# Patient Record
Sex: FEMALE | Race: WHITE | NOT HISPANIC OR LATINO | Employment: OTHER | URBAN - METROPOLITAN AREA
[De-identification: names, ages, dates, MRNs, and addresses within clinical notes are randomized per-mention and may not be internally consistent; named-entity substitution may affect disease eponyms.]

---

## 2017-01-06 ENCOUNTER — TELEPHONE (OUTPATIENT)
Dept: ELECTROPHYSIOLOGY | Facility: CLINIC | Age: 62
End: 2017-01-06

## 2017-01-06 NOTE — TELEPHONE ENCOUNTER
Called pt to schedule tilt test as referred by . Pt voiced she does was not aware of need for test. Pt reports she has felt better since stopping a particular medication started by her Endocrinologist. Pt verbalized she will speak with her daughter who is a nurse and call back with final decision. Spent time describing reason for, and what is done during test. Understanding verbalized.

## 2017-01-07 ENCOUNTER — PATIENT MESSAGE (OUTPATIENT)
Dept: OPHTHALMOLOGY | Facility: CLINIC | Age: 62
End: 2017-01-07

## 2017-01-07 ENCOUNTER — PATIENT MESSAGE (OUTPATIENT)
Dept: CARDIOLOGY | Facility: CLINIC | Age: 62
End: 2017-01-07

## 2017-01-09 NOTE — TELEPHONE ENCOUNTER
Pt notified. Pt verbalized understanding. Pt stated that she does not want to do tilt table test. Pt stated that she does not think that she would be able to handle doing the test because of the length of time it takes to complete the test and that she is also concerned that the test will be costly.

## 2017-01-10 ENCOUNTER — PATIENT MESSAGE (OUTPATIENT)
Dept: ENDOCRINOLOGY | Facility: CLINIC | Age: 62
End: 2017-01-10

## 2017-01-10 ENCOUNTER — OFFICE VISIT (OUTPATIENT)
Dept: ENDOCRINOLOGY | Facility: CLINIC | Age: 62
End: 2017-01-10
Payer: COMMERCIAL

## 2017-01-10 VITALS
SYSTOLIC BLOOD PRESSURE: 126 MMHG | HEART RATE: 79 BPM | RESPIRATION RATE: 16 BRPM | WEIGHT: 95.25 LBS | DIASTOLIC BLOOD PRESSURE: 86 MMHG | HEIGHT: 59 IN | BODY MASS INDEX: 19.2 KG/M2

## 2017-01-10 DIAGNOSIS — E55.9 VITAMIN D DEFICIENCY: ICD-10-CM

## 2017-01-10 DIAGNOSIS — E10.649 TYPE 1 DIABETES MELLITUS WITH HYPOGLYCEMIA AND WITHOUT COMA: ICD-10-CM

## 2017-01-10 DIAGNOSIS — Z13.29 SCREENING FOR THYROID DISORDER: ICD-10-CM

## 2017-01-10 PROCEDURE — 1159F MED LIST DOCD IN RCRD: CPT | Mod: S$GLB,,, | Performed by: INTERNAL MEDICINE

## 2017-01-10 PROCEDURE — 4010F ACE/ARB THERAPY RXD/TAKEN: CPT | Mod: S$GLB,,, | Performed by: INTERNAL MEDICINE

## 2017-01-10 PROCEDURE — 99999 PR PBB SHADOW E&M-EST. PATIENT-LVL III: CPT | Mod: PBBFAC,,, | Performed by: INTERNAL MEDICINE

## 2017-01-10 PROCEDURE — 99205 OFFICE O/P NEW HI 60 MIN: CPT | Mod: S$GLB,,, | Performed by: INTERNAL MEDICINE

## 2017-01-10 PROCEDURE — 3046F HEMOGLOBIN A1C LEVEL >9.0%: CPT | Mod: S$GLB,,, | Performed by: INTERNAL MEDICINE

## 2017-01-10 NOTE — PROGRESS NOTES
"Chief Complaint: No chief complaint on file.      HPI:  Annie Maher is 61 y.o. female with T1DM, initially diagnosed at age 20 , here today for evaluation and management   She is a prior patient of Dr. Granger and SAMMIE Reed, [facility] - last office visit in      Since that time, she has followed with an outside Endocrinologist and PCP ( [facility])     She presents to clinic today due to concerns of recent hypoglycemic episodes.  By her report, her diabetes has been "brittle" since the time of diagnosis. She is unaware of low sugars and is "terrified" of hypoglycemia.     She is very anxious, nervous and confused about her insulin regimen at this time     Current DM medications:   Lantus 16 units every AM   Novolog  2/2/2 + correction scale   7am -7pm : 1 units /12 grams   7pm -7am: 1 units / 20 grams     Uses vials or pens: pens   Type of meter: once touch verio     SMBG 4-6 times daily. Personal review of blood glucose log   Fastin, 133,   Lunch: 148, 213, 84  Dinner: 148, 161, 201  Bedtime: 108, 207, 202  1am readin  2am readin  4am readin, 146  6am readin    Tuesday Adrian. 10, 2017  12am = 80  4:50am  = 46, apple juice   8:30 am = 92  10am = 143  11.45am = 342    Has CGM device at home but patient denies use. She states that the device is uncomfortable for her     Hypoglycemic episodes mostly occur early morning after 12 midnight.   She reports giving additional insulin at bedtime per correction scale  if her blood glucose is over 150 mg/dL     Diabetes complications: hypoglycemia, neuropathy and retinopathy     Diabetic eye exam: 2016   Podiatry exam: "a few years ago"     Reports hospital admission related to diabetes , at initial diagnosis     Denies symptomatic CAD or CVD     24 hour dietary recall:   Breakfast: 2 slices of wheat toast with peanut butter and coffee with milk   Lunch: skipped lunch   Dinner: chicken salad with wheat wrap and " fruit, water   Snacks: handful of cashews     Diabetes education: Yes  Exercise: Elliptical _ 1-2  Times weekly     ADA STANDARDS OF CARE   ACE inhibitor or angiotension II receptor blocker: recently discontinued by Cardiology   Statin drug: denies   Low dose ASA: denies   Dental exam: up to date   Flu shot: per PCP   Pneumonia vaccine: denies     Wears medic alert tag: Yes    Has Glucagon ER kit: No      Review of Systems   Constitutional: Positive for appetite change. Negative for fatigue and unexpected weight change.   HENT: Negative for sore throat and trouble swallowing.    Eyes: Negative for visual disturbance.   Respiratory: Negative for cough and shortness of breath.    Cardiovascular: Negative for chest pain, palpitations and leg swelling.   Gastrointestinal: Negative for abdominal pain, constipation, diarrhea, nausea and vomiting.   Endocrine: Negative for cold intolerance, heat intolerance, polydipsia, polyphagia and polyuria.   Genitourinary: Negative for dysuria.   Musculoskeletal: Negative for back pain.   Skin: Negative for rash and wound.   Allergic/Immunologic: Negative for immunocompromised state.   Neurological: Negative for dizziness, numbness and headaches.   Hematological: Does not bruise/bleed easily.   Psychiatric/Behavioral: Positive for confusion. Negative for sleep disturbance. The patient is nervous/anxious.        Physical Exam   Constitutional: She is oriented to person, place, and time. No distress.   Small frame   HENT:   Right Ear: External ear normal.   Left Ear: External ear normal.   Nose: Nose normal.   Hearing normal  Dentition good    Neck: No tracheal deviation present. No thyromegaly present.   Cardiovascular: Normal rate and regular rhythm.    No murmur heard.  Pulses:       Dorsalis pedis pulses are 2+ on the right side, and 2+ on the left side.   Pulmonary/Chest: Effort normal and breath sounds normal.   Abdominal: Soft. There is no tenderness. No hernia.    Musculoskeletal: She exhibits no edema or tenderness.        Right foot: There is no deformity.        Left foot: There is no deformity.   Gait normal  No clubbing or cyanosis noted   Feet:   Right Foot:   Protective Sensation: 6 sites tested. 6 sites sensed.   Skin Integrity: Negative for ulcer, blister, skin breakdown, callus or dry skin.   Left Foot:   Protective Sensation: 6 sites tested. 6 sites sensed.   Skin Integrity: Negative for ulcer, blister, skin breakdown, callus or dry skin.   Neurological: She is alert and oriented to person, place, and time. No cranial nerve deficit.   Feet -sensation intact to vibration and monofilament     Skin: Skin is warm and dry. No rash noted.   No nodules  Injection sites are ok. No lipo hypertropthy or atrophy     Psychiatric: She has a normal mood and affect. Judgment normal.   Nursing note and vitals reviewed.      Labs:  Hemoglobin A1C   Date Value Ref Range Status   06/21/2010 7.9 (H) 4.0 - 6.2 % Final   07/10/2008 7.2 (H) 4.0 - 6.2 % Final   05/01/2008 7.5 (H) 4.0 - 6.2 % Final     No results found for: CHOL, HDL, LDLCALC, TRIG, CHOLHDL  Lab Results   Component Value Date     05/01/2008    K 3.6 05/01/2008     05/01/2008    CO2 29 05/01/2008     (H) 05/01/2008    BUN 13 05/01/2008    CREATININE 0.8 05/01/2008    CALCIUM 9.8 05/01/2008    PROT 7.3 05/01/2008    ALBUMIN 5.1 05/01/2008    BILITOT 0.3 05/01/2008    ALKPHOS 83 05/01/2008    AST 24 05/01/2008    ALT 15 05/01/2008         Assessment and Plan:  Type 1 diabetes mellitus with hypoglycemia and without coma  - pt is very confused about insulin timing despite having diabetes since the age of 20   - discussed possibility of insulin pump - pt declined   - recommend CGM for 72 hours   - for now, will start weight based dosing   - lantus 10 units every AM   - continue Novolog 2/2/2 with I:C = 1:20   - avoid additional Novolog at bedtime   - smbg 4-6 times daily   - send logs in 2 weeks for review  and insulin adjustments  - wear Medic alert tag at all times   - new Rx for Glucagon ER kit   -     GLUCOSE MONITORING CONTINUOUS MIN 72 HOURS; Future  -     glucagon (human recombinant) inj 1mg/mL kit; Inject 1 mL (1 mg total) into the muscle as needed.  Dispense: 1 kit; Refill: 6  -     Comprehensive metabolic panel; Future; Expected date: 1/10/17  -     Hemoglobin A1c; Future; Expected date: 1/10/17  -     Cortisol, 8AM; Future; Expected date: 1/10/17  -     TISSUE TRANSGLUTAMINASE, IGA; Future; Expected date: 1/10/17    Screening for thyroid disorder  -     TSH; Future; Expected date: 1/10/17  -     Thyroid peroxidase antibody; Future; Expected date: 1/10/17    Vitamin D deficiency  -     Vitamin D; Future; Expected date: 1/10/17      Case discussed in consultation with Dr. Granger   Recommendations were discussed with the patient in detail  The patient agrees with the treatment plan as outlined above    RTC in 6 weeks for follow up   The patient is instructed to notify the office with BG concerns or questions

## 2017-01-10 NOTE — MR AVS SNAPSHOT
[facility]  [address]  [place] [address]  Phone: [phone]  Fax: [phone]                  Annie Maher   1/10/2017 11:30 AM   Office Visit    Description:  Female : 1955   Provider:  Hailee Jimenez NP   Department:  [facility]           Diagnoses this Visit        Comments    Type 1 diabetes mellitus with hypoglycemia and without coma         Screening for thyroid disorder         Vitamin D deficiency                To Do List           Future Appointments        Provider Department Dept Phone    2/15/2017 8:00 AM LAB, [facility] [phone]    2017 10:30 AM [facility] [phone]      Goals (5 Years of Data)     None      Follow-Up and Disposition     Return in about 6 weeks (around 2017).       These Medications        Disp Refills Start End    glucagon (human recombinant) inj 1mg/mL kit 1 kit 6 1/10/2017 1/10/2018    Inject 1 mL (1 mg total) into the muscle as needed. - Intramuscular    Ph[facility]: [facility] - [place] [address] [address] Ph #: [phone]         [facility] [place] [facility]     [facility] -  Assistance  Registered nurses in the [facility] provide clinical advisement, health education, appointment booking, and other advisory services.  Call for this free service at [phone].             Medications           Message regarding Medications     Verify the changes and/or additions to your medication regime listed below are the same as discussed with your clinician today.  If any of these changes or additions are incorrect, please notify your healthcare provider.        START taking these NEW medications        Refills    glucagon (human recombinant) inj 1mg/mL kit 6    Sig: Inject 1 mL (1 mg total) into the muscle as needed.    Class: Normal    Route:  Intramuscular           Verify that the below list of medications is an accurate representation of the medications you are currently taking.  If none reported, the list may be blank. If incorrect, please contact your healthcare provider. Carry this list with you in case of emergency.           Current Medications     ACCU-CHEK COMPACT TEST Strp     alprazolam (XANAX) 1 MG tablet Take 1 tablet by mouth Use as needed.    aspirin 81 MG Chew Take 81 mg by mouth once daily.    calcium carbonate 220 mg capsule Take 220 mg by mouth once daily.     carisoprodol (SOMA) 350 MG tablet Take 1 tablet by mouth Daily.    fish oil-omega-3 fatty acids 300-1,000 mg capsule Take 2 g by mouth once daily.      FLUARIX QUAD 6293-0645, PF, 60 mcg (15 mcg x 4)/0.5 mL Syrg inject 0.5 milliliter intramuscularly    gabapentin (NEURONTIN) 300 MG capsule Take 1 tablet by mouth Twice daily.    glucosamine-chondroitin 500-400 mg tablet Take 1 tablet by mouth 3 (three) times daily. Pt taking once a day.    insulin glargine (LANTUS) 100 unit/mL injection Inject 7 Units into the skin 2 (two) times daily. 15 units once a day.    meloxicam (MOBIC) 15 MG tablet Take 1 tablet (15 mg total) by mouth once daily. Take a Prilosec 20 mg tablet (over the counter) once a day every day while taking Mobic    mirtazapine (REMERON) 15 MG tablet Take 15 mg by mouth every evening.     multivitamin capsule Take 1 capsule by mouth once daily.      NOVOLOG FLEXPEN 100 unit/mL InPn Sliding scale    omeprazole (PRILOSEC OTC) 20 MG tablet Take 20 mg by mouth once daily.    ONETOUCH VERIO Strp     ramipril (ALTACE) 5 MG capsule 5 mg.    sertraline (ZOLOFT) 100 MG tablet Take 1 tablet by mouth Twice daily.    tramadol (ULTRAM) 50 mg tablet Take 50 mg by mouth every 8 (eight) hours.    glucagon (human recombinant) inj 1mg/mL kit Inject 1 mL (1 mg total) into the muscle as needed.           Clinical Reference Information           Vital Signs - Last Recorded  Most recent  "update: 1/10/2017 11:59 AM by Della Hernandez MA    BP Pulse Resp Ht Wt BMI    126/86 (BP Location: Left arm, Patient Position: Sitting, BP Method: Manual) 79 16 4' 11" (1.499 m) 43.2 kg (95 lb 3.8 oz) 19.24 kg/m2      Blood Pressure          Most Recent Value    BP  126/86      Allergies as of 1/10/2017     Pcn [Penicillins]    Stelazine [Trifluoperazine]    Sulfa (Sulfonamide Antibiotics)      Immunizations Administered on Date of Encounter - 1/10/2017     None      Orders Placed During Today's Visit     Future Labs/Procedures Expected by Expires    Comprehensive metabolic panel  1/10/2017 (Approximate) 1/5/2018    Cortisol, 8AM  1/10/2017 3/11/2018    Hemoglobin A1c  1/10/2017 3/11/2018    Thyroid peroxidase antibody  1/10/2017 3/11/2018    TISSUE TRANSGLUTAMINASE, IGA  1/10/2017 3/11/2018    TSH  1/10/2017 (Approximate) 1/5/2018    Vitamin D  1/10/2017 3/11/2018    GLUCOSE MONITORING CONTINUOUS MIN 72 HOURS  As directed 1/10/2018      "

## 2017-01-17 ENCOUNTER — PATIENT MESSAGE (OUTPATIENT)
Dept: ENDOCRINOLOGY | Facility: CLINIC | Age: 62
End: 2017-01-17

## 2017-01-18 ENCOUNTER — CLINICAL SUPPORT (OUTPATIENT)
Dept: ENDOCRINOLOGY | Facility: CLINIC | Age: 62
End: 2017-01-18
Payer: COMMERCIAL

## 2017-01-18 NOTE — PROGRESS NOTES
"DIABETES EDUCATOR NOTE   PLACEMENT OF FREESTYLE DALY PRO SENSOR  CONTINOUS GLUCOSE MONITORING SYSTEM (CGMS)    Patient is here in clinic today for placement of continuous glucose monitoring sensor.      Each patient verified that they were here for CGMS procedure ordered by their provider and that they have a working glucose meter and supplies at home.   Patient will be provided with a Freestyle Daly Sensor and a copy of the Continuous Glucose Monitoring Patient Log to fill out during the study.   A detailed  explanation of Continuous Glucose Monitoring was  provided. Patient informed that this is a blind procedure and that they will not actually see the blood sugar tracing in real time.  Reviewed with patient the [employer] patient education handout called "Your Freestyle Daly Pro sensor: What you need to know" to review self-care during the study to avoid sensor loosening or removal ie...bathing, swimming, dressing, and exercising.   Instructed patient to check blood sugar using home glucometer and to record the following on provided patient log sheets:  blood sugar taken at home, meals and snacks, activity, and diabetes medications taken and dosage    Patient was brought to a private location.   Arm for insertion was selected and prepared and allowed to dry. Glucose sensor Serial Number [id number] was inserted to back of patients left upper arm.     The following forms were given and reviewed in detail with patient and all questions answered.   · Continuous Glucose Monitoring Patient Log #[id number]  · Freestyle [employer] Patient Handout "Your Freestyle Daly Pro Sensor: What you need to know"     Instructions held in a group: Time: 15 min   Insertion of sensor done individually in private:  Time: 5 minutes   "

## 2017-01-23 ENCOUNTER — CLINICAL SUPPORT (OUTPATIENT)
Dept: ENDOCRINOLOGY | Facility: CLINIC | Age: 62
End: 2017-01-23
Payer: COMMERCIAL

## 2017-01-23 DIAGNOSIS — E10.649 TYPE 1 DIABETES MELLITUS WITH HYPOGLYCEMIA AND WITHOUT COMA: ICD-10-CM

## 2017-01-23 PROCEDURE — 99999 PR PBB SHADOW E&M-EST. PATIENT-LVL I: CPT | Mod: PBBFAC,,,

## 2017-01-23 PROCEDURE — 95250 CONT GLUC MNTR PHYS/QHP EQP: CPT | Mod: S$GLB,,, | Performed by: DIETITIAN, REGISTERED

## 2017-01-24 ENCOUNTER — PATIENT MESSAGE (OUTPATIENT)
Dept: ENDOCRINOLOGY | Facility: CLINIC | Age: 62
End: 2017-01-24

## 2017-01-25 NOTE — PROGRESS NOTES
DIABETES EDUCATOR NOTE   Return of Freestyle Cammy Pro Sensor and Patient Log.    Patient returned to clinic today to return Glucose Sensor and signed patient log form used in [facility] procedure.    The CGMS Sensor will be scanned and downloaded. All reports will be imported into the patient's electronic medical record.    Endocrine Nurse Practitioner will complete data interpretation and make recommendations; will forward recommendations to the ordering provider for follow up with patient.

## 2017-01-27 ENCOUNTER — PATIENT MESSAGE (OUTPATIENT)
Dept: ENDOCRINOLOGY | Facility: CLINIC | Age: 62
End: 2017-01-27

## 2017-01-27 DIAGNOSIS — E10.649 TYPE 1 DIABETES MELLITUS WITH HYPOGLYCEMIA AND WITHOUT COMA: Primary | ICD-10-CM

## 2017-01-31 RX ORDER — MELOXICAM 15 MG/1
TABLET ORAL
Qty: 30 TABLET | Refills: 2 | Status: SHIPPED | OUTPATIENT
Start: 2017-01-31 | End: 2020-01-30

## 2017-02-01 ENCOUNTER — PATIENT MESSAGE (OUTPATIENT)
Dept: ENDOCRINOLOGY | Facility: CLINIC | Age: 62
End: 2017-02-01

## 2017-02-15 ENCOUNTER — LAB VISIT (OUTPATIENT)
Dept: LAB | Facility: HOSPITAL | Age: 62
End: 2017-02-15
Attending: INTERNAL MEDICINE
Payer: COMMERCIAL

## 2017-02-15 DIAGNOSIS — Z13.29 SCREENING FOR THYROID DISORDER: ICD-10-CM

## 2017-02-15 DIAGNOSIS — E55.9 VITAMIN D DEFICIENCY: ICD-10-CM

## 2017-02-15 DIAGNOSIS — E10.649 TYPE 1 DIABETES MELLITUS WITH HYPOGLYCEMIA AND WITHOUT COMA: ICD-10-CM

## 2017-02-15 LAB
25(OH)D3+25(OH)D2 SERPL-MCNC: 23 NG/ML
ALBUMIN SERPL BCP-MCNC: 4.1 G/DL
ALP SERPL-CCNC: 68 U/L
ALT SERPL W/O P-5'-P-CCNC: 18 U/L
ANION GAP SERPL CALC-SCNC: 9 MMOL/L
AST SERPL-CCNC: 31 U/L
BILIRUB SERPL-MCNC: 0.2 MG/DL
BUN SERPL-MCNC: 14 MG/DL
CALCIUM SERPL-MCNC: 10 MG/DL
CHLORIDE SERPL-SCNC: 99 MMOL/L
CO2 SERPL-SCNC: 30 MMOL/L
CORTIS SERPL-MCNC: 17.2 UG/DL
CREAT SERPL-MCNC: 0.9 MG/DL
EST. GFR  (AFRICAN AMERICAN): >60 ML/MIN/1.73 M^2
EST. GFR  (NON AFRICAN AMERICAN): >60 ML/MIN/1.73 M^2
ESTIMATED AVG GLUCOSE: 177 MG/DL
GLUCOSE SERPL-MCNC: 169 MG/DL
HBA1C MFR BLD HPLC: 7.8 %
POTASSIUM SERPL-SCNC: 4.3 MMOL/L
PROT SERPL-MCNC: 7.5 G/DL
SODIUM SERPL-SCNC: 138 MMOL/L
T4 FREE SERPL-MCNC: 0.91 NG/DL
THYROPEROXIDASE IGG SERPL-ACNC: <6 IU/ML
TSH SERPL DL<=0.005 MIU/L-ACNC: 4.95 UIU/ML

## 2017-02-15 PROCEDURE — 80053 COMPREHEN METABOLIC PANEL: CPT

## 2017-02-15 PROCEDURE — 84443 ASSAY THYROID STIM HORMONE: CPT

## 2017-02-15 PROCEDURE — 82306 VITAMIN D 25 HYDROXY: CPT

## 2017-02-15 PROCEDURE — 36415 COLL VENOUS BLD VENIPUNCTURE: CPT | Mod: PO

## 2017-02-15 PROCEDURE — 86376 MICROSOMAL ANTIBODY EACH: CPT

## 2017-02-15 PROCEDURE — 83516 IMMUNOASSAY NONANTIBODY: CPT

## 2017-02-15 PROCEDURE — 82533 TOTAL CORTISOL: CPT

## 2017-02-15 PROCEDURE — 83036 HEMOGLOBIN GLYCOSYLATED A1C: CPT

## 2017-02-15 PROCEDURE — 84439 ASSAY OF FREE THYROXINE: CPT

## 2017-02-17 LAB — TTG IGA SER IA-ACNC: 4 UNITS

## 2017-02-21 ENCOUNTER — OFFICE VISIT (OUTPATIENT)
Dept: ENDOCRINOLOGY | Facility: CLINIC | Age: 62
End: 2017-02-21
Payer: COMMERCIAL

## 2017-02-21 VITALS
SYSTOLIC BLOOD PRESSURE: 122 MMHG | HEART RATE: 80 BPM | WEIGHT: 96.31 LBS | RESPIRATION RATE: 16 BRPM | HEIGHT: 59 IN | DIASTOLIC BLOOD PRESSURE: 74 MMHG | BODY MASS INDEX: 19.42 KG/M2

## 2017-02-21 DIAGNOSIS — E10.649 TYPE 1 DIABETES MELLITUS WITH HYPOGLYCEMIA AND WITHOUT COMA: Primary | ICD-10-CM

## 2017-02-21 DIAGNOSIS — E55.9 VITAMIN D DEFICIENCY: ICD-10-CM

## 2017-02-21 DIAGNOSIS — R79.89 ABNORMAL THYROID STIMULATING HORMONE (TSH) LEVEL: ICD-10-CM

## 2017-02-21 PROCEDURE — 99214 OFFICE O/P EST MOD 30 MIN: CPT | Mod: S$GLB,,, | Performed by: INTERNAL MEDICINE

## 2017-02-21 PROCEDURE — 99999 PR PBB SHADOW E&M-EST. PATIENT-LVL III: CPT | Mod: PBBFAC,,, | Performed by: INTERNAL MEDICINE

## 2017-02-21 PROCEDURE — 3045F PR MOST RECENT HEMOGLOBIN A1C LEVEL 7.0-9.0%: CPT | Mod: S$GLB,,, | Performed by: INTERNAL MEDICINE

## 2017-02-21 PROCEDURE — 1160F RVW MEDS BY RX/DR IN RCRD: CPT | Mod: S$GLB,,, | Performed by: INTERNAL MEDICINE

## 2017-02-21 PROCEDURE — 3060F POS MICROALBUMINURIA REV: CPT | Mod: S$GLB,,, | Performed by: INTERNAL MEDICINE

## 2017-02-21 NOTE — MR AVS SNAPSHOT
[facility]  [address]  [facility] [address]  Phone: [phone]  Fax: [phone]                  Annie Maher   2017 10:30 AM   Office Visit    Description:  Female : 1955   Provider:  Hailee Jimenez NP   Department:  [facility]           Reason for Visit     Diabetes Mellitus           Diagnoses this Visit        Comments    Type 1 diabetes mellitus with hypoglycemia and without coma    -  Primary     Vitamin D deficiency         Abnormal thyroid stimulating hormone (TSH) level                To Do List           Future Appointments        Provider Department Dept Phone    2017 8:30 AM [facility] [phone]    2017 3:30 PM [facility] [phone]      Goals (5 Years of Data)     None      Follow-Up and Disposition     Return in about 3 months (around 2017).      [facility]     [facility] - 24/7 Assistance  Registered nurses in the [facility] provide clinical advisement, health education, appointment booking, and other advisory services.  Call for this free service at [phone].             Medications           Message regarding Medications     Verify the changes and/or additions to your medication regime listed below are the same as discussed with your clinician today.  If any of these changes or additions are incorrect, please notify your healthcare provider.             Verify that the below list of medications is an accurate representation of the medications you are currently taking.  If none reported, the list may be blank. If incorrect, please contact your healthcare provider. Carry this list with you in case of emergency.           Current Medications     ACCU-CHEK COMPACT TEST Strp     alprazolam (XANAX) 1 MG tablet Take 1 tablet by mouth Use as needed.    aspirin 81 MG Chew Take  "81 mg by mouth once daily.    calcium carbonate 220 mg capsule Take 220 mg by mouth once daily.     carisoprodol (SOMA) 350 MG tablet Take 1 tablet by mouth Daily.    fish oil-omega-3 fatty acids 300-1,000 mg capsule Take 2 g by mouth once daily.      FLUARIX QUAD 0287-0049, PF, 60 mcg (15 mcg x 4)/0.5 mL Syrg inject 0.5 milliliter intramuscularly    gabapentin (NEURONTIN) 300 MG capsule Take 1 tablet by mouth Twice daily.    glucagon (human recombinant) inj 1mg/mL kit Inject 1 mL (1 mg total) into the muscle as needed.    glucosamine-chondroitin 500-400 mg tablet Take 1 tablet by mouth 3 (three) times daily. Pt taking once a day.    insulin glargine (LANTUS) 100 unit/mL injection Inject 7 Units into the skin 2 (two) times daily. 15 units once a day.    meloxicam (MOBIC) 15 MG tablet TAKE 1 TABLET BY MOUTH ONCE DAILY. TAKE A PRILOSEC (OVER THE COUNTER) ONCE A DAY WHILE TAKING MELOXICAM    mirtazapine (REMERON) 15 MG tablet Take 15 mg by mouth every evening.     multivitamin capsule Take 1 capsule by mouth once daily.      NOVOLOG FLEXPEN 100 unit/mL InPn Sliding scale    omeprazole (PRILOSEC OTC) 20 MG tablet Take 20 mg by mouth once daily.    ONETOUCH VERIO Strp     ramipril (ALTACE) 5 MG capsule 5 mg.    sertraline (ZOLOFT) 100 MG tablet Take 1 tablet by mouth Twice daily.    tramadol (ULTRAM) 50 mg tablet Take 50 mg by mouth every 8 (eight) hours.           Clinical Reference Information           Your Vitals Were     BP Pulse Resp Height Weight BMI    122/74 (BP Location: Right arm, Patient Position: Sitting, BP Method: Manual) 80 16 4' 11" (1.499 m) 43.7 kg (96 lb 5.5 oz) 19.46 kg/m2      Blood Pressure          Most Recent Value    BP  122/74      Allergies as of 2/21/2017     Pcn [Penicillins]    Stelazine [Trifluoperazine]    Sulfa (Sulfonamide Antibiotics)      Immunizations Administered on Date of Encounter - 2/21/2017     None      Orders Placed During Today's Visit      Normal Orders This Visit    " Ambulatory Referral to Diabetes Education     Future Labs/Procedures Expected by Expires    Comprehensive metabolic panel  2/21/2017 (Approximate) 2/16/2018    Hemoglobin A1c  2/21/2017 4/22/2018    Lipid panel  2/21/2017 3/28/2018    TSH  2/21/2017 (Approximate) 2/16/2018      Language Assistance Services     ATTENTION: Language assistance services are available, free of charge. Please call [phone].      ATENCIÓN: Si habla español, tiene a denise disposición servicios gratuitos de asistencia lingüística. Llame al [phone].     CHÚ Ý: N?u b?n nói Ti?ng Vi?t, có các d?ch v? h? tr? ngôn ng? mi?n phí dành cho b?n. G?i s? [phone].         [facility] complies with applicable Federal civil rights laws and does not discriminate on the basis of race, color, national origin, age, disability, or sex.

## 2017-02-21 NOTE — PROGRESS NOTES
"Chief Complaint: Diabetes Mellitus      HPI:  Annie Maher is 61 y.o. female with T1DM, initially diagnosed at age 20 , here today for follow up visit   The patient is known to me with last documented office visit in 01/2017   Pt reports better blood glucose control since last visit     Current DM medications:   Lantus 12 units every AM   Novolog with I:C = 1:20     Uses vials or pens: pens   Type of meter: once touch verio     SMBG 4-6 times daily. Personal review of blood glucose log from 02/14/2017-02/20/2017:  5am: 222, 75, 99, 99, 198  AC breakfast: 218, 156, 187, 74, 164, 266, 129, 213  1pm: 286, 178, 221, 144, 232, 200, 202  6pm: 187, 110, 135, 290, 205, 204, 139  8pm: 56, 405,   10pm: 150, 144, 190, 288, 432(no insulin with dinner), 170    Has CGM device at home but patient denies use. She states that the device is uncomfortable for her     Hypoglycemia: 2 episodes of hypoglycemia since last visit   Treated appropriately by taking 6 ounces of juice and complex carb     Diabetes complications: hypoglycemia, neuropathy and retinopathy     Diabetic eye exam: 12/22/2016   Podiatry exam: "a few years ago"     Reports hospital admission related to diabetes , at initial diagnosis     Denies symptomatic CAD or CVD     Diabetes education: Yes  Exercise: Elliptical _ 1-2  Times weekly     ADA STANDARDS OF CARE   ACE inhibitor or angiotension II receptor blocker: not taking, discontinued per Cardiology   Statin drug: discontinued by another provider   Low dose ASA: denies   Dental exam: up to date   Flu shot: per PCP   Pneumonia vaccine: denies     Wears medic alert tag: yes     Has Glucagon ER kit: yes       Review of Systems   Constitutional: Positive for appetite change. Negative for fatigue and unexpected weight change.   HENT: Negative for sore throat and trouble swallowing.    Eyes: Negative for visual disturbance.   Respiratory: Negative for cough and shortness of breath.    Cardiovascular: Negative for chest " pain, palpitations and leg swelling.   Gastrointestinal: Negative for abdominal pain, constipation, diarrhea, nausea and vomiting.   Endocrine: Negative for cold intolerance, heat intolerance, polydipsia, polyphagia and polyuria.   Genitourinary: Negative for dysuria.   Musculoskeletal: Negative for back pain.   Skin: Negative for rash and wound.   Allergic/Immunologic: Negative for immunocompromised state.   Neurological: Negative for dizziness, numbness and headaches.   Hematological: Does not bruise/bleed easily.   Psychiatric/Behavioral: Positive for confusion. Negative for sleep disturbance. The patient is nervous/anxious.        Physical Exam   Constitutional: She is oriented to person, place, and time. No distress.   Small frame   HENT:   Right Ear: External ear normal.   Left Ear: External ear normal.   Nose: Nose normal.   Hearing normal  Dentition good    Neck: No tracheal deviation present. No thyromegaly present.   Cardiovascular: Normal rate and regular rhythm.    No murmur heard.  Pulses:       Dorsalis pedis pulses are 2+ on the right side, and 2+ on the left side.   Pulmonary/Chest: Effort normal and breath sounds normal.   Abdominal: Soft. There is no tenderness. No hernia.   Musculoskeletal: She exhibits no edema or tenderness.        Right foot: There is no deformity.        Left foot: There is no deformity.   Gait normal  No clubbing or cyanosis noted   Feet:   Right Foot:   Protective Sensation: 6 sites tested. 6 sites sensed.   Skin Integrity: Negative for ulcer, blister, skin breakdown, callus or dry skin.   Left Foot:   Protective Sensation: 6 sites tested. 6 sites sensed.   Skin Integrity: Negative for ulcer, blister, skin breakdown, callus or dry skin.   Neurological: She is alert and oriented to person, place, and time. No cranial nerve deficit.   Feet -sensation intact to vibration and monofilament     Skin: Skin is warm and dry. No rash noted.   No nodules  Injection sites are ok. No  lipo hypertropthy or atrophy     Psychiatric: She has a normal mood and affect. Judgment normal.   Nursing note and vitals reviewed.      Labs:  Hemoglobin A1C   Date Value Ref Range Status   02/15/2017 7.8 (H) 4.5 - 6.2 % Final     Comment:     According to ADA guidelines, hemoglobin A1C <7.0% represents  optimal control in non-pregnant diabetic patients.  Different  metrics may apply to specific populations.   Standards of Medical Care in Diabetes - 2016.  For the purpose of screening for the presence of diabetes:  <5.7%     Consistent with the absence of diabetes  5.7-6.4%  Consistent with increasing risk for diabetes   (prediabetes)  >or=6.5%  Consistent with diabetes  Currently no consensus exists for use of hemoglobin A1C  for diagnosis of diabetes for children.     06/21/2010 7.9 (H) 4.0 - 6.2 % Final   07/10/2008 7.2 (H) 4.0 - 6.2 % Final   05/01/2008 7.5 (H) 4.0 - 6.2 % Final     No results found for: CHOL, HDL, LDLCALC, TRIG, CHOLHDL  Lab Results   Component Value Date     02/15/2017    K 4.3 02/15/2017    CL 99 02/15/2017    CO2 30 (H) 02/15/2017     (H) 02/15/2017    BUN 14 02/15/2017    CREATININE 0.9 02/15/2017    CALCIUM 10.0 02/15/2017    PROT 7.5 02/15/2017    ALBUMIN 4.1 02/15/2017    BILITOT 0.2 02/15/2017    ALKPHOS 68 02/15/2017    AST 31 02/15/2017    ALT 18 02/15/2017    ANIONGAP 9 02/15/2017    ESTGFRAFRICA >60.0 02/15/2017    EGFRNONAA >60.0 02/15/2017         Assessment and Plan:  1. Type 1 diabetes mellitus with hypoglycemia and without coma  - better control at this time per personal review of BG log   - continue Lantus 12 units every AM   - continue Novolog per correction scale with I:C = 1:20   - avoid additional Novolog at bedtime   - continue to smbg 4-6 times daily   - send logs in 2 weeks for review and insulin adjustments  - wear Medic alert tag at all times   - has glucagon ER kit at home   - for diabetic neuropathy: may add Alpha Lipoic acid to treatment plan   -  refer to Diabetes educator on the [facility] to discuss resuming CGM device         RTC in 3 months for follow up   The patient is instructed to notify the office with BG concerns or questions

## 2017-03-02 ENCOUNTER — TELEPHONE (OUTPATIENT)
Dept: DIABETES | Facility: CLINIC | Age: 62
End: 2017-03-02

## 2017-03-02 NOTE — TELEPHONE ENCOUNTER
Spoke with pt to schedule Diabetes education.  Pt needs to rely on her daughter to bring her.  They already have plans to have education done at [facility].  Pt declined education at this location.

## 2017-03-21 ENCOUNTER — TELEPHONE (OUTPATIENT)
Dept: CARDIOLOGY | Facility: CLINIC | Age: 62
End: 2017-03-21

## 2017-03-21 DIAGNOSIS — E11.43 AUTONOMIC DYSFUNCTION WITH TYPE 2 DIABETES MELLITUS: Primary | ICD-10-CM

## 2017-05-23 ENCOUNTER — PATIENT MESSAGE (OUTPATIENT)
Dept: ENDOCRINOLOGY | Facility: CLINIC | Age: 62
End: 2017-05-23

## 2017-05-23 ENCOUNTER — LAB VISIT (OUTPATIENT)
Dept: LAB | Facility: HOSPITAL | Age: 62
End: 2017-05-23
Attending: INTERNAL MEDICINE
Payer: COMMERCIAL

## 2017-05-23 DIAGNOSIS — E11.43 AUTONOMIC DYSFUNCTION WITH TYPE 2 DIABETES MELLITUS: ICD-10-CM

## 2017-05-23 DIAGNOSIS — E10.649 TYPE 1 DIABETES MELLITUS WITH HYPOGLYCEMIA AND WITHOUT COMA: ICD-10-CM

## 2017-05-23 DIAGNOSIS — R79.89 ABNORMAL THYROID STIMULATING HORMONE (TSH) LEVEL: ICD-10-CM

## 2017-05-23 LAB
ALBUMIN SERPL BCP-MCNC: 3.7 G/DL
ALBUMIN SERPL BCP-MCNC: 3.7 G/DL
ALP SERPL-CCNC: 49 U/L
ALP SERPL-CCNC: 49 U/L
ALT SERPL W/O P-5'-P-CCNC: 16 U/L
ALT SERPL W/O P-5'-P-CCNC: 16 U/L
ANION GAP SERPL CALC-SCNC: 13 MMOL/L
AST SERPL-CCNC: 30 U/L
AST SERPL-CCNC: 30 U/L
BILIRUB DIRECT SERPL-MCNC: 0.2 MG/DL
BILIRUB SERPL-MCNC: 0.5 MG/DL
BILIRUB SERPL-MCNC: 0.5 MG/DL
BUN SERPL-MCNC: 15 MG/DL
CALCIUM SERPL-MCNC: 9.7 MG/DL
CHLORIDE SERPL-SCNC: 102 MMOL/L
CHOLEST/HDLC SERPL: 3.3 {RATIO}
CO2 SERPL-SCNC: 27 MMOL/L
CREAT SERPL-MCNC: 0.8 MG/DL
EST. GFR  (AFRICAN AMERICAN): >60 ML/MIN/1.73 M^2
EST. GFR  (NON AFRICAN AMERICAN): >60 ML/MIN/1.73 M^2
GLUCOSE SERPL-MCNC: 142 MG/DL
HDL/CHOLESTEROL RATIO: 30 %
HDLC SERPL-MCNC: 270 MG/DL
HDLC SERPL-MCNC: 81 MG/DL
LDLC SERPL CALC-MCNC: 169.8 MG/DL
NONHDLC SERPL-MCNC: 189 MG/DL
POTASSIUM SERPL-SCNC: 4.4 MMOL/L
PROT SERPL-MCNC: 6.7 G/DL
PROT SERPL-MCNC: 6.7 G/DL
SODIUM SERPL-SCNC: 142 MMOL/L
T4 FREE SERPL-MCNC: 0.92 NG/DL
TRIGL SERPL-MCNC: 96 MG/DL
TSH SERPL DL<=0.005 MIU/L-ACNC: 4.73 UIU/ML

## 2017-05-23 PROCEDURE — 83036 HEMOGLOBIN GLYCOSYLATED A1C: CPT

## 2017-05-23 PROCEDURE — 80076 HEPATIC FUNCTION PANEL: CPT

## 2017-05-23 PROCEDURE — 80053 COMPREHEN METABOLIC PANEL: CPT

## 2017-05-23 PROCEDURE — 80061 LIPID PANEL: CPT

## 2017-05-23 PROCEDURE — 84443 ASSAY THYROID STIM HORMONE: CPT

## 2017-05-23 PROCEDURE — 84439 ASSAY OF FREE THYROXINE: CPT

## 2017-05-23 PROCEDURE — 36415 COLL VENOUS BLD VENIPUNCTURE: CPT | Mod: PO

## 2017-05-24 ENCOUNTER — TELEPHONE (OUTPATIENT)
Dept: CARDIOLOGY | Facility: CLINIC | Age: 62
End: 2017-05-24

## 2017-05-24 DIAGNOSIS — E78.5 DYSLIPIDEMIA: Primary | ICD-10-CM

## 2017-05-24 LAB
ESTIMATED AVG GLUCOSE: 171 MG/DL
HBA1C MFR BLD HPLC: 7.6 %

## 2017-05-24 RX ORDER — ROSUVASTATIN CALCIUM 40 MG/1
40 TABLET, COATED ORAL NIGHTLY
COMMUNITY

## 2017-05-24 NOTE — TELEPHONE ENCOUNTER
----- Message from Ciera Silva MD sent at 5/24/2017 12:43 AM CDT -----  Based on patient new lipid profile results and CV risk patient should be on statin.  She can either restart Crestor ( 20 mg daily, which is half of her original dose) or Atorvastatin 20 mg daily and repeat blood work in 2 months

## 2017-06-01 ENCOUNTER — OFFICE VISIT (OUTPATIENT)
Dept: ENDOCRINOLOGY | Facility: CLINIC | Age: 62
End: 2017-06-01
Payer: COMMERCIAL

## 2017-06-01 VITALS
RESPIRATION RATE: 16 BRPM | WEIGHT: 104.5 LBS | SYSTOLIC BLOOD PRESSURE: 117 MMHG | DIASTOLIC BLOOD PRESSURE: 64 MMHG | BODY MASS INDEX: 21.07 KG/M2 | HEART RATE: 80 BPM | HEIGHT: 59 IN

## 2017-06-01 DIAGNOSIS — E10.649 TYPE 1 DIABETES MELLITUS WITH HYPOGLYCEMIA AND WITHOUT COMA: Primary | ICD-10-CM

## 2017-06-01 DIAGNOSIS — R79.89 ABNORMAL THYROID STIMULATING HORMONE (TSH) LEVEL: ICD-10-CM

## 2017-06-01 DIAGNOSIS — E55.9 VITAMIN D DEFICIENCY: ICD-10-CM

## 2017-06-01 DIAGNOSIS — E78.5 HYPERLIPIDEMIA, UNSPECIFIED HYPERLIPIDEMIA TYPE: ICD-10-CM

## 2017-06-01 PROCEDURE — 3045F PR MOST RECENT HEMOGLOBIN A1C LEVEL 7.0-9.0%: CPT | Mod: S$GLB,,, | Performed by: INTERNAL MEDICINE

## 2017-06-01 PROCEDURE — 99999 PR PBB SHADOW E&M-EST. PATIENT-LVL III: CPT | Mod: PBBFAC,,, | Performed by: INTERNAL MEDICINE

## 2017-06-01 PROCEDURE — 99214 OFFICE O/P EST MOD 30 MIN: CPT | Mod: S$GLB,,, | Performed by: INTERNAL MEDICINE

## 2017-06-01 NOTE — PROGRESS NOTES
Chief Complaint: Diabetes Mellitus      HPI:  Annie Maher is 62 y.o. female with T1DM, initially diagnosed at age 20 , here today for follow up visit     Current DM medications:   Lantus 12 units every AM   Novolog with I:C = 1:20, usually aroung 3-5 units with each meal     Uses vials or pens: pens   Type of meter: once touch verio     SMBG 4-8 times daily. Very difficult to interpret BG log due to patient handwriting   She is inconsistent with writing down the time of BG checks     She states on average BG levels have been between 140's - 220's     Hypoglycemia: reports 2 blood sugars of 61 mg/dL within the past 7 weeks   She treats appropriately by taking 6 ounces of juice and complex carb     Not consistent with taking prandial injections with meals   Sometimes she takes Novolog up to 2 hours after eating     She is also administering Novolog at bedtime if BG is high and then she reports Hypoglcyemia around 2am      Has CGM device at home but patient refuses to use. She states that the device is uncomfortable for her     Known complications: hypoglycemia, neuropathy and retinopathy     Diabetic eye exam: 12/22/2016     Does not follow with podiatry     Reports hospital admission related to diabetes at initial diagnosis     Denies symptomatic CAD or CVD     Diabetes education: Yes, daughter reports on several occassions   Exercise: Elliptical _ 1-2  Times weekly     ADA STANDARDS OF CARE   ACE inhibitor or angiotension II receptor blocker: Altace discontinued per Cardiology due to hypotension   Statin drug: Crestor   Low dose ASA: denies   Dental exam: up to date     Review of Systems   Constitutional: Positive for appetite change. Negative for fatigue and unexpected weight change.   HENT: Negative for sore throat and trouble swallowing.    Eyes: Negative for visual disturbance.   Respiratory: Negative for cough and shortness of breath.    Cardiovascular: Negative for chest pain, palpitations and leg  swelling.   Gastrointestinal: Negative for abdominal pain, constipation, diarrhea, nausea and vomiting.   Endocrine: Negative for cold intolerance, heat intolerance, polydipsia, polyphagia and polyuria.   Genitourinary: Negative for dysuria.   Musculoskeletal: Negative for back pain.   Skin: Negative for rash and wound.   Allergic/Immunologic: Negative for immunocompromised state.   Neurological: Negative for dizziness, numbness and headaches.   Hematological: Does not bruise/bleed easily.   Psychiatric/Behavioral: Positive for confusion. Negative for sleep disturbance. The patient is nervous/anxious.        Physical Exam   Constitutional: No distress.   Small frame   Neck: No tracheal deviation present. No thyromegaly present.   Cardiovascular: Normal rate and regular rhythm.    No murmur heard.  Pulses:       Dorsalis pedis pulses are 2+ on the right side, and 2+ on the left side.   Pulmonary/Chest: Effort normal.   Abdominal: Soft. Bowel sounds are normal. There is no tenderness. No hernia.   Some abdominal bruising noted    Musculoskeletal: She exhibits no edema.        Right foot: There is no deformity.        Left foot: There is no deformity.   Gait normal  No clubbing or cyanosis noted   Feet:   Right Foot:   Protective Sensation: 6 sites tested. 6 sites sensed.   Skin Integrity: Negative for ulcer, blister, skin breakdown, callus or dry skin.   Left Foot:   Protective Sensation: 6 sites tested. 6 sites sensed.   Skin Integrity: Negative for ulcer, blister, skin breakdown, callus or dry skin.   Neurological: She is alert.   Mildly decreased vibratory sensation noted bilaterally      Skin: Skin is warm. No rash noted.        Psychiatric: Her speech is normal. Thought content normal. Her mood appears anxious. She is hyperactive. Cognition and memory are impaired.   Nursing note and vitals reviewed.  Feet: shoes inappropriate     Labs:  Hemoglobin A1C   Date Value Ref Range Status   05/23/2017 7.6 (H) 4.5 - 6.2  % Final     Comment:     According to ADA guidelines, hemoglobin A1C <7.0% represents  optimal control in non-pregnant diabetic patients.  Different  metrics may apply to specific populations.   Standards of Medical Care in Diabetes - 2016.  For the purpose of screening for the presence of diabetes:  <5.7%     Consistent with the absence of diabetes  5.7-6.4%  Consistent with increasing risk for diabetes   (prediabetes)  >or=6.5%  Consistent with diabetes  Currently no consensus exists for use of hemoglobin A1C  for diagnosis of diabetes for children.     02/15/2017 7.8 (H) 4.5 - 6.2 % Final     Comment:     According to ADA guidelines, hemoglobin A1C <7.0% represents  optimal control in non-pregnant diabetic patients.  Different  metrics may apply to specific populations.   Standards of Medical Care in Diabetes - 2016.  For the purpose of screening for the presence of diabetes:  <5.7%     Consistent with the absence of diabetes  5.7-6.4%  Consistent with increasing risk for diabetes   (prediabetes)  >or=6.5%  Consistent with diabetes  Currently no consensus exists for use of hemoglobin A1C  for diagnosis of diabetes for children.     06/21/2010 7.9 (H) 4.0 - 6.2 % Final   07/10/2008 7.2 (H) 4.0 - 6.2 % Final   05/01/2008 7.5 (H) 4.0 - 6.2 % Final     Lab Results   Component Value Date    CHOL 270 (H) 05/23/2017    HDL 81 (H) 05/23/2017    LDLCALC 169.8 (H) 05/23/2017    TRIG 96 05/23/2017    CHOLHDL 30.0 05/23/2017     Lab Results   Component Value Date     05/23/2017    K 4.4 05/23/2017     05/23/2017    CO2 27 05/23/2017     (H) 05/23/2017    BUN 15 05/23/2017    CREATININE 0.8 05/23/2017    CALCIUM 9.7 05/23/2017    PROT 6.7 05/23/2017    PROT 6.7 05/23/2017    ALBUMIN 3.7 05/23/2017    ALBUMIN 3.7 05/23/2017    BILITOT 0.5 05/23/2017    BILITOT 0.5 05/23/2017    ALKPHOS 49 (L) 05/23/2017    ALKPHOS 49 (L) 05/23/2017    AST 30 05/23/2017    AST 30 05/23/2017    ALT 16 05/23/2017    ALT 16  05/23/2017    ANIONGAP 13 05/23/2017    ESTGFRAFRICA >60.0 05/23/2017    EGFRNONAA >60.0 05/23/2017         Assessment and Plan:  1. Type 1 diabetes mellitus with hypoglycemia and without coma  Ambulatory Referral to Diabetes Education    Comprehensive metabolic panel    Hemoglobin A1c    Microalbumin/creatinine urine ratio   2. Hyperlipidemia, unspecified hyperlipidemia type     3. Vitamin D deficiency     4. Abnormal thyroid stimulating hormone (TSH) level  TSH       1. Type 1 diabetes mellitus with hypoglycemia and without coma  - better control at this time per personal review of BG log   - no changes to MDI   - avoid additional Novolog at bedtime due to hypoglycemia at 2am   - send logs in 2 weeks for review   - wear Medic alert tag at all times   - has glucagon ER kit at home   - for diabetic neuropathy: continue Alpha Lipoic Acid, pt reports improvement since starting therapy  - refer to diabetes education at main campus ,pt needs review of self management skills and also to possibly reintroduce CGM device   - pt will require close follow up due to declining mental status     2. Hyperlipidemia   - on Crestor 20 mg daily   - encouraged low fat, low chol diet   - followed by Cardiology     3. Vitamin D def   - start over the counter vitamin D3 at 1,000 IU's daily     4. Abnormal TFTs  - recommend periodic monitoring of thyroid function tests   - reviewed indications for treatment   - reviewed signs and symptoms of hypothyroidism         The patient is instructed to notify the office with BG concerns or questions

## 2017-07-18 ENCOUNTER — CLINICAL SUPPORT (OUTPATIENT)
Dept: DIABETES | Facility: CLINIC | Age: 62
End: 2017-07-18
Payer: COMMERCIAL

## 2017-07-18 DIAGNOSIS — E10.649 TYPE 1 DIABETES MELLITUS WITH HYPOGLYCEMIA AND WITHOUT COMA: ICD-10-CM

## 2017-07-18 PROCEDURE — G0108 DIAB MANAGE TRN  PER INDIV: HCPCS | Mod: S$GLB,,, | Performed by: DIETITIAN, REGISTERED

## 2017-07-19 NOTE — PROGRESS NOTES
Diabetes Education  Author: Alley Foy RD  Date: 7/19/2017    Diabetes Education Visit  Diabetes Education Record Assessment/Progress: Initial    Diabetes Type  Diabetes Type : Type I    Diabetes History  Diabetes Diagnosis: >10 years      Monitoring   Self Monitoring :  (SMBG 4-8 times daily)  Blood Glucose Logs: Yes (gobal ranges:  mg/dL)      Current Diabetes Treatment   Current Treatment: Insulin (Lantus (rx 15 units daily) Pt taking 12 units in the morning; Novolog 3-5 units with meals (depending on IC ratio of 1:20) + correction scale 180-230 +1)    Social History  Preferred Learning Method: Face to Face  Primary Support: Self, Daughter (daughter present at visit)    Barriers to Change  Barriers to Change: None (daughter reports beginning to have some issues with memory)  Learning Challenges : None    Readiness to Learn   Readiness to Learn : Acceptance    Cultural Influences  Cultural Influences: No      Diabetes Education Assessment/Progress  Acute Complications (preventing, detecting, and treating acute complications): Discussion, Individual Session, Written Materials Provided, Instructed (Reviewed hypo symptoms and how to treat. Pt with extreme hypo unawareness. Discussed resuming with Dexcom CGM to prevent hypos.)    Chronic Complications (preventing, detecting, and treating chronic complications): Discussion    Diabetes Disease Process (diabetes disease process and treatment options): Discussion    Nutrition (Incorporating nutritional management into one's lifestyle): Discussion, Individual Session, Written Materials Provided, Instructed (Reviewed IC ratio of 1:20. Reviewed ADA diet and recommendations. Discussed options for when pt is out to eat. Discussed ways to look up carb amounts online/apps. )    Medications (states correct name, dose, onset, peak, duration, side effects & timing of meds): Discussion, Individual Session, Written Materials Provided, Instructed (Discussed insulin pump and  basic features/advantages. Reviewed correction scale; reviewed timing and MOA of both insulins.)    Monitoring (monitoring blood glucose/other parameters & using results): Discussion, Individual Session, Written Materials Provided, Instructed, Demonstration, Return Demonstration (Pt and daughter both wish to resume Dexcom CGM. Pt brought all supplies with her today. Dexcom sensor placed and session started. Discussed alert settings; Increased low alert to 100 mg/dL so pt is aware when her BG is beginning to drop. Instructed pt that she does not need to do finger sticks so often with dexcom; she can base correction scale off of dexcom reading. )      Goals  Monitoring: Set (Will use dexcom as instructed)         Diabetes Care Plan/Intervention  Education Plan/Intervention: Individual Follow-Up DSMT, Endocrine Provider Visit Set Up    Diabetes Meal Plan  Restrictions: Restricted Carbohydrate    Education Units of Time   Time Spent: 90 min      Health Maintenance Topics with due status: Not Due       Topic Last Completion Date    Eye Exam 12/22/2016    Lipid Panel 05/23/2017    Hemoglobin A1c 05/23/2017    Foot Exam 06/01/2017    Influenza Vaccine Not Due     Health Maintenance Due   Topic Date Due    Hepatitis C Screening  1955    Urine Microalbumin  03/05/1963    TETANUS VACCINE  03/05/1971    Pneumococcal PPSV23 (Medium Risk) (1) 03/05/1971    Pap Smear with HPV Cotest  03/05/1974    Mammogram  03/05/1993    Colonoscopy  03/05/2003    Zoster Vaccine  03/05/2013

## 2017-07-25 ENCOUNTER — LAB VISIT (OUTPATIENT)
Dept: LAB | Facility: HOSPITAL | Age: 62
End: 2017-07-25
Attending: INTERNAL MEDICINE
Payer: COMMERCIAL

## 2017-07-25 DIAGNOSIS — E78.5 DYSLIPIDEMIA: ICD-10-CM

## 2017-07-25 LAB
ALBUMIN SERPL BCP-MCNC: 3.9 G/DL
ALP SERPL-CCNC: 50 U/L
ALT SERPL W/O P-5'-P-CCNC: 14 U/L
AST SERPL-CCNC: 25 U/L
BILIRUB DIRECT SERPL-MCNC: 0.2 MG/DL
BILIRUB SERPL-MCNC: 0.5 MG/DL
CHOLEST/HDLC SERPL: 2.9 {RATIO}
HDL/CHOLESTEROL RATIO: 34.7 %
HDLC SERPL-MCNC: 213 MG/DL
HDLC SERPL-MCNC: 74 MG/DL
LDLC SERPL CALC-MCNC: 123 MG/DL
NONHDLC SERPL-MCNC: 139 MG/DL
PROT SERPL-MCNC: 7.1 G/DL
TRIGL SERPL-MCNC: 80 MG/DL

## 2017-07-25 PROCEDURE — 36415 COLL VENOUS BLD VENIPUNCTURE: CPT | Mod: PO

## 2017-07-25 PROCEDURE — 80061 LIPID PANEL: CPT

## 2017-07-25 PROCEDURE — 80076 HEPATIC FUNCTION PANEL: CPT

## 2017-07-27 ENCOUNTER — TELEPHONE (OUTPATIENT)
Dept: CARDIOLOGY | Facility: CLINIC | Age: 62
End: 2017-07-27

## 2017-07-27 NOTE — TELEPHONE ENCOUNTER
----- Message from Ciera Silva MD sent at 7/27/2017 12:04 PM CDT -----  Mrs. Maher,  Please review result of your blood work.  It looks as when you stopped your medication, bad cholesterol ( LDL-C went up to over 160 and when you restarted it it went down to 120. I think you should continue on this medication, but if for some reasons you cannot tolerate it we could try another medication called Zetia. Statin is better however.

## 2017-12-13 ENCOUNTER — OFFICE VISIT (OUTPATIENT)
Dept: SPORTS MEDICINE | Facility: CLINIC | Age: 62
End: 2017-12-13
Payer: COMMERCIAL

## 2017-12-13 ENCOUNTER — HOSPITAL ENCOUNTER (OUTPATIENT)
Dept: RADIOLOGY | Facility: HOSPITAL | Age: 62
Discharge: HOME | End: 2017-12-13
Attending: ORTHOPAEDIC SURGERY
Payer: COMMERCIAL

## 2017-12-13 VITALS
SYSTOLIC BLOOD PRESSURE: 126 MMHG | BODY MASS INDEX: 21.07 KG/M2 | WEIGHT: 104.5 LBS | DIASTOLIC BLOOD PRESSURE: 68 MMHG | HEART RATE: 91 BPM | HEIGHT: 59 IN

## 2017-12-13 DIAGNOSIS — M25.569 KNEE PAIN, UNSPECIFIED CHRONICITY, UNSPECIFIED LATERALITY: Primary | ICD-10-CM

## 2017-12-13 DIAGNOSIS — M25.569 KNEE PAIN, UNSPECIFIED CHRONICITY, UNSPECIFIED LATERALITY: ICD-10-CM

## 2017-12-13 DIAGNOSIS — M25.559 ARTHRALGIA OF HIP, UNSPECIFIED LATERALITY: ICD-10-CM

## 2017-12-13 PROCEDURE — 73564 X-RAY EXAM KNEE 4 OR MORE: CPT | Mod: TC,50,PO

## 2017-12-13 PROCEDURE — 73564 X-RAY EXAM KNEE 4 OR MORE: CPT | Mod: 26,59,RT, | Performed by: RADIOLOGY

## 2017-12-13 PROCEDURE — 99214 OFFICE O/P EST MOD 30 MIN: CPT | Mod: S$GLB,,, | Performed by: ORTHOPAEDIC SURGERY

## 2017-12-13 PROCEDURE — 73523 X-RAY EXAM HIPS BI 5/> VIEWS: CPT | Mod: TC,PO,LT

## 2017-12-13 PROCEDURE — 99999 PR PBB SHADOW E&M-EST. PATIENT-LVL III: CPT | Mod: PBBFAC,,, | Performed by: ORTHOPAEDIC SURGERY

## 2017-12-13 PROCEDURE — 73564 X-RAY EXAM KNEE 4 OR MORE: CPT | Mod: 26,LT,, | Performed by: RADIOLOGY

## 2017-12-13 PROCEDURE — 73523 X-RAY EXAM HIPS BI 5/> VIEWS: CPT | Mod: 26,,, | Performed by: RADIOLOGY

## 2017-12-13 NOTE — PROGRESS NOTES
CC: right knee pain    62 y.o. Female  reports anterior knee pain refractory to conservative mgmt.    She reports that the pain is worse with steps.  It also bothers her at night.    Is affecting ADLs.     No mechanical symptoms, no instability    Review of Systems   Constitution: Negative. Negative for chills, fever and night sweats.   HENT: Negative for congestion and headaches.    Eyes: Negative for blurred vision, left vision loss and right vision loss.   Cardiovascular: Negative for chest pain and syncope.   Respiratory: Negative for cough and shortness of breath.    Endocrine: Negative for polydipsia, polyphagia and polyuria.   Hematologic/Lymphatic: Negative for bleeding problem. Does not bruise/bleed easily.   Skin: Negative for dry skin, itching and rash.   Musculoskeletal: Negative for falls and muscle weakness.   Gastrointestinal: Negative for abdominal pain and bowel incontinence.   Genitourinary: Negative for bladder incontinence and nocturia.   Neurological: Negative for disturbances in coordination, loss of balance and seizures.   Psychiatric/Behavioral: Negative for depression. The patient does not have insomnia.    Allergic/Immunologic: Negative for hives and persistent infections.     PAST MEDICAL HISTORY:   Past Medical History:   Diagnosis Date    Anxiety     Arthritis     Cataract     Diabetes mellitus     High cholesterol     Osteoporosis, unspecified     Retinal detachment     Spondylosis      PAST SURGICAL HISTORY:   Past Surgical History:   Procedure Laterality Date    CATARACT EXTRACTION      RETINAL DETACHMENT SURGERY      SHOULDER SURGERY       FAMILY HISTORY:   Family History   Problem Relation Age of Onset    Cancer Father     Diabetes Brother     Heart failure Mother     Heart attack Mother     Heart disease Mother     Hyperlipidemia Mother     Hypertension Neg Hx     Stroke Neg Hx      SOCIAL HISTORY:   Social History     Social History    Marital status:       Spouse name: N/A    Number of children: N/A    Years of education: N/A     Occupational History    Not on file.     Social History Main Topics    Smoking status: Never Smoker    Smokeless tobacco: Not on file    Alcohol use 0.0 oz/week      Comment: occ    Drug use: No    Sexual activity: Not on file     Other Topics Concern    Not on file     Social History Narrative    No narrative on file       MEDICATIONS:   Current Outpatient Prescriptions:     ACCU-CHEK COMPACT TEST Strp, , Disp: , Rfl:     alprazolam (XANAX) 1 MG tablet, Take 1 tablet by mouth Use as needed., Disp: , Rfl:     aspirin 81 MG Chew, Take 81 mg by mouth once daily., Disp: , Rfl:     calcium carbonate 220 mg capsule, Take 220 mg by mouth once daily. , Disp: , Rfl:     carisoprodol (SOMA) 350 MG tablet, Take 1 tablet by mouth Daily., Disp: , Rfl:     fish oil-omega-3 fatty acids 300-1,000 mg capsule, Take 2 g by mouth once daily.  , Disp: , Rfl:     FLUARIX QUAD 1190-3056, PF, 60 mcg (15 mcg x 4)/0.5 mL Syrg, inject 0.5 milliliter intramuscularly, Disp: , Rfl: 0    gabapentin (NEURONTIN) 300 MG capsule, Take 1 tablet by mouth Twice daily., Disp: , Rfl:     glucagon (human recombinant) inj 1mg/mL kit, Inject 1 mL (1 mg total) into the muscle as needed., Disp: 1 kit, Rfl: 6    glucosamine-chondroitin 500-400 mg tablet, Take 1 tablet by mouth 3 (three) times daily. Pt taking once a day., Disp: , Rfl:     insulin glargine (LANTUS) 100 unit/mL injection, Inject 7 Units into the skin 2 (two) times daily. 15 units once a day., Disp: , Rfl:     meloxicam (MOBIC) 15 MG tablet, TAKE 1 TABLET BY MOUTH ONCE DAILY. TAKE A PRILOSEC (OVER THE COUNTER) ONCE A DAY WHILE TAKING MELOXICAM, Disp: 30 tablet, Rfl: 2    mirtazapine (REMERON) 15 MG tablet, Take 15 mg by mouth every evening. , Disp: , Rfl: 0    multivitamin capsule, Take 1 capsule by mouth once daily.  , Disp: , Rfl:     NOVOLOG FLEXPEN 100 unit/mL InPn, Sliding scale, Disp: ,  "Rfl:     omeprazole (PRILOSEC OTC) 20 MG tablet, Take 20 mg by mouth once daily., Disp: , Rfl:     ONETOUCH VERIO Strp, , Disp: , Rfl: 0    rosuvastatin (CRESTOR) 40 MG Tab, Take 40 mg by mouth every evening. Pt to take 1/2 tablet once a day. , Disp: , Rfl:     sertraline (ZOLOFT) 100 MG tablet, Take 1 tablet by mouth Twice daily., Disp: , Rfl:     tramadol (ULTRAM) 50 mg tablet, Take 50 mg by mouth every 8 (eight) hours., Disp: , Rfl: 0  ALLERGIES:   Review of patient's allergies indicates:   Allergen Reactions    Pcn [penicillins] Hives and Shortness Of Breath    Stelazine [trifluoperazine] Other (See Comments)     Slurred speech, looked like I had a stroke    Sulfa (sulfonamide antibiotics) Rash       VITAL SIGNS: /68   Pulse 91   Ht 4' 11" (1.499 m)   Wt 47.4 kg (104 lb 8 oz)   BMI 21.11 kg/m²      PHYSICAL EXAMINATION    General:  The patient is alert and oriented x 3.  Mood is pleasant.  Observation of ears, eyes and nose reveal no gross abnormalities.  HEENT: NCAT, sclera nonicteric  Lungs: Respirations are equal and unlabored.    right  KNEE EXAMINATION     OBSERVATION / INSPECTION   Gait:   Nonantalgic   Alignment:  Neutral   Scars:   None   Muscle atrophy: Mild  Effusion:  None   Warmth:  None   Discoloration:   none     TENDERNESS / CREPITUS (T / C):          T / C      T / C   Patella   - / -   Lateral joint line   - / -      Peripatellar medial  +  Medial joint line    - / -   Peripatellar lateral +  Medial plica   - / -   Patellar tendon -   Popliteal fossa  - / -   Quad tendon   -   Gastrocnemius   -   Prepatellar Bursa - / -   Quadricep   -   Tibial tubercle  -  Thigh/hamstring  -   Pes anserine/HS -  Fibula    -   ITB   - / -  Tibia     -   Tib/fib joint  - / -  LCL    -     MFC   - / -   MCL: Proximal  -    LFC   - / -    Distal   -          ROM: (* = pain)  PASSIVE   ACTIVE    Left :   5 / 0 / 145   5 / 0 / 145     Right :    5 / 0 / 145   5 / 0 / 145    Patellofemoral " examination:  See above noted areas of tenderness.   Patella position    Subluxation / dislocation: Centered           Sup. / Inf;   Normal   Crepitus (PF):    Absent   Patellar Mobility:       Medial-lateral:   Normal    Superior-inferior:  Normal    Inferior tilt   Normal    Patellar tendon:  Normal   Lateral tilt:    Normal   J-sign:     None   Patellofemoral grind:   +       MENISCAL SIGNS:     Pain on terminal extension:  -  Pain on terminal flexion:  -  Zitas maneuver:  -  Squat     -    LIGAMENT EXAMINATION:  ACL / Lachman:  normal (-1 to 2mm)    PCL-Post.  drawer: normal 0 to 2mm  MCL- Valgus:  normal 0 to 2mm  LCL- Varus:  normal 0 to 2mm  Pivot shift: normal (Equal)   Dial Test: difference c/w other side   At 30° flexion: normal (< 5°)    At 90° flexion: normal (< 5°)   Reverse Pivot Shift:   normal (Equal)     STRENGTH: (* = with pain) PAINFUL SIDE   Quadricep   5/5   Hamstrin/5    EXTREMITY NEURO-VASCULAR EXAMINATION:   Sensation:  Grossly intact to light touch all dermatomal regions.   Motor Function:  Fully intact motor function at hip, knee, foot and ankle    DTRs;  quadriceps and  achilles 2+.  No clonus and downgoing Babinski.    Vascular status:  DP and PT pulses 2+, brisk capillary refill, symmetric.     Other Findings:  + step down bilat  + bridge test     Knees: + chondrocalcinosis on xrays bilat  Hips: + mild joint space narrowing on right     ASSESSMENT:    1. Bilateral hip abd/core weakness    PLAN:  1. PT for bilateral hip abd/core strengthing  2. NSAIDS prn  3. All questions were answered, pt will contact us for questions or concerns in the interim.  4. Possible Dr. Claudio hip injection to right hip    F/u with Alirio

## 2017-12-26 ENCOUNTER — CLINICAL SUPPORT (OUTPATIENT)
Dept: REHABILITATION | Facility: HOSPITAL | Age: 62
End: 2017-12-26
Attending: ORTHOPAEDIC SURGERY
Payer: COMMERCIAL

## 2017-12-26 DIAGNOSIS — M79.604 PAIN OF RIGHT LOWER EXTREMITY: ICD-10-CM

## 2017-12-26 DIAGNOSIS — M53.86 DECREASED ROM OF LUMBAR SPINE: ICD-10-CM

## 2017-12-26 DIAGNOSIS — R29.898 WEAKNESS OF RIGHT HIP: ICD-10-CM

## 2017-12-26 PROCEDURE — 97162 PT EVAL MOD COMPLEX 30 MIN: CPT | Mod: PO

## 2017-12-26 PROCEDURE — 97110 THERAPEUTIC EXERCISES: CPT | Mod: PO

## 2017-12-26 NOTE — PLAN OF CARE
OUTPATIENT PHYSICAL THERAPY  PHYSICAL THERAPY EVALUATION    Name: Annie Maher  Clinic Number: [id number]    Diagnosis:   1. Pain of right lower extremity     2. Weakness of right hip     3. Decreased ROM of lumbar spine        Physician: Kristen Grayson MD  Treatment Orders: PT Eval and Treat  Past Medical History:   Diagnosis Date    Anxiety     Arthritis     Cataract     Diabetes mellitus     High cholesterol     Osteoporosis, unspecified     Retinal detachment     Spondylosis      Current Outpatient Prescriptions   Medication Sig    ACCU-CHEK COMPACT TEST Strp     alprazolam (XANAX) 1 MG tablet Take 1 tablet by mouth Use as needed.    aspirin 81 MG Chew Take 81 mg by mouth once daily.    calcium carbonate 220 mg capsule Take 220 mg by mouth once daily.     carisoprodol (SOMA) 350 MG tablet Take 1 tablet by mouth Daily.    fish oil-omega-3 fatty acids 300-1,000 mg capsule Take 2 g by mouth once daily.      FLUARIX QUAD 8573-9018, PF, 60 mcg (15 mcg x 4)/0.5 mL Syrg inject 0.5 milliliter intramuscularly    gabapentin (NEURONTIN) 300 MG capsule Take 1 tablet by mouth Twice daily.    glucagon (human recombinant) inj 1mg/mL kit Inject 1 mL (1 mg total) into the muscle as needed.    glucosamine-chondroitin 500-400 mg tablet Take 1 tablet by mouth 3 (three) times daily. Pt taking once a day.    insulin glargine (LANTUS) 100 unit/mL injection Inject 7 Units into the skin 2 (two) times daily. 15 units once a day.    meloxicam (MOBIC) 15 MG tablet TAKE 1 TABLET BY MOUTH ONCE DAILY. TAKE A PRILOSEC (OVER THE COUNTER) ONCE A DAY WHILE TAKING MELOXICAM    mirtazapine (REMERON) 15 MG tablet Take 15 mg by mouth every evening.     multivitamin capsule Take 1 capsule by mouth once daily.      NOVOLOG FLEXPEN 100 unit/mL InPn Sliding scale    omeprazole (PRILOSEC OTC) 20 MG tablet Take 20 mg by mouth once daily.    ONETOUCH VERIO Strp     rosuvastatin (CRESTOR) 40 MG Tab Take 40 mg by mouth every evening.  "Pt to take 1/2 tablet once a day.     sertraline (ZOLOFT) 100 MG tablet Take 1 tablet by mouth Twice daily.    tramadol (ULTRAM) 50 mg tablet Take 50 mg by mouth every 8 (eight) hours.     No current facility-administered medications for this visit.      Review of patient's allergies indicates:   Allergen Reactions    Pcn [penicillins] Hives and Shortness Of Breath    Stelazine [trifluoperazine] Other (See Comments)     Slurred speech, looked like I had a stroke    Sulfa (sulfonamide antibiotics) Rash       Time in: 1:05 PM   Time Out: 2:00 PM   Total Treatment Time: 55 minutes    Date of eval: 12/26/2017  Visit #: 1/40  Auth expiration: 12/31/17  POC expiration: 3/31/18     Precautions: standard    Subjective   History of Present Illness: pt reports having chronic low back, hip and knee pain. Pt reports that when she sleeps she has pain in her hip bone and into her ITB region (R>L). Pt reports knee pain mostly when getting up from the floor. Pt reports being type I diabetic and "being so used to pain."  Onset:: insidious  Patient c/o: continuous symptoms of achy pain with occasional "stabbing" pain  Pain Scale: Annie rates pain on a scale of 0-10 to be6 currently; 7 at worst; 4 at best .  Aggravating factors: "if I do a little too much"  Relieving factors: heating pad, stretches    Previous treatment: PT for LBP years ago  Imaging: X-ray, MRI was told it revealed "both my hips are bad, both my knees are bad and it's only going to get worse."  Past surgical history: B shoulder rotator cuff repair    Prior level of function: same  Functional deficits: unable to get up and down from floor easily  Occupation: retired manicurist, work duties include:   Environment: two story home with 9 steps    No cultural or spiritual barriers identified to treatment or learning.  Patient's goals: The patients goal is to return to PLOF without pain so that she can help her daughter with her new baby (expected in a few " months).      Objective   Mental status: oriented x3, difficulty with recall, appears slightly anxious  Posture/ Alignment: Protruded Head, Protracted Scapula, increased thoracic kyphosis    GAIT DEVIATIONS: Annie amb with decreased mark, decreased weight-shifting ability and mild B trendelenberg gait.    FUNCTION:   - SLS R: 4 sec   - SLS L: 6 sec  - Bed Mobility: increased time and B UE support required throughout  - Stairs: able to navigate with alternating gait pattern and B UE support throughout; decreased nm control c/ descent    ROM:    AROM ROM (% of normal) ROM Comment Normal   Flexion: 75%  * pain in low back 60 deg   Extension: 100%   25 deg   Lateral Flex R: 100%   25 deg   Lateral Flex L: 100%  * pulling in L side 25 deg   Rotation R: 100%   18 deg   Rotation L: 100%   18 deg   *denotes pain      Strength:      Right Left Comment   Hip Flexion: 4-/5 * 4/5 Pain in hip, knee, and back   Hip ABD: 4-/5 4/5    Hip ADD: 4-/5 * 4/5 Pain in add and hip   Hip Extension: 4-/5 * 4-/5 * Pain in R hip   Knee Ext: 4+/5 4+/5    Knee Flex: 4-/5 4+/5        Special Tests:  - JUAN R: R positive, L negative  - FADIR: R positive, L negative  - Scour: R negative, L negative   - SLR: R negative, L negative      Palpation: tenderness upon palpation of B medial knee, R ITB, lumbosacral vertebrae    Joint Play: normal lumbar motion c/ [place], pt reports pain with motion towards SIJ      Treatment: pt participated in therapeutic exercises including piriformis stretch, figure 4 stretch, TA sets c /5 sec hold    Pt/family was provided educational information, including: role of PT, goals for PT, scheduling - pt verbalized understanding. Discussed insurance plan with pt.     Assessment   Annie is a 62 y.o. female referred to outpatient physical therapy with a medical diagnosis of knee pain due to muscle weakness and imbalance. Demonstrates impairments including limitations as described in the problem list. Pt prognosis is  Fair. Positive prognostic factors include motivation to improve. Negative prognostic factors include chronicity of pain, sedentary lifestyle. Pt will benefit from skilled outpatient physical therapy to address the above stated deficits, provide pt/family education, and to maximize pt's level of independence.     History  Co-morbidities and personal factors that may impact the plan of care Examination  Body Structures and Functions, activity limitations and participation restrictions that may impact the plan of care    Clinical Presentation   Co-morbidities:   type I diabetes, memory loss        Personal Factors:   coping style  lifestyle Body Regions:   back  lower extremities    Body Systems:   gross symmetry  strength  balance  gait  motor control        Participation Restrictions:   Unable to sit <> stand from floor     Activity limitations:   Mobility  walking    Self care  no deficits    Domestic Life  doing house work (cleaning house, washing dishes, laundry)  assisting others    Community and Social Life  community life  recreation and leisure         unstable clinical presentation with unpredictable characteristics                      high   moderate  high Decision Making/ Complexity Score:  moderate     Pt's spiritual, cultural and educational needs considered and pt agreeable to plan of care and goals as stated below:     Anticipated Barriers for physical therapy: none noted    GOALS:  Short Term GOALS:  In 4 weeks, pt. will:  Pt will report decrease in pain </= 5 at worst in order to more easily cook and complete house work.  Pt will demonstrate > 10 deg increase in lumbar ROM to allow for better ability to reach lower shelves.  Pt will demonstrate improved posture during exercises with minimal cueing in order to improve body mechanics with activity.  Pt will demonstrate increased B LE mm strength by 1 MMT grade to increase tolerance to standing activity.  Pt will demonstrate stability with AROM of B LE  5/5 trials in order to signify improved core strength.     Long Term GOALS:  In 8 weeks, pt. Will:  Pt will report decrease in pain </= 3 in order to perform floor transfers.  Pt will demonstrate a return to lumbar ROM that is WNL in order to allow for greater tolerance to lifting activity.  Pt will demonstrate improved posture with lifting 20# floor to waist 5/5 trials with no cueing in order to improve carrying ability.  Pt will demonstrate increased strength in core mm and B LE to 4+/5 MMT grade to increase ability to ambulate >/= 1 mile.  Pt will be independent with HEP and SX management       Plan   Outpatient physical therapy 1- 2 times weekly to include: pt ed, HEP, therapeutic exercises, therapeutic activities, neuromuscular re-education/ balance exercises, manual therapy, and modalities prn. Cont PT for 2-3 months. Pt may be seen by PTA as part of the rehabilitation team.     I certify the need for these services furnished under this plan of treatment and while under my care.    Corona Pineda, PT

## 2017-12-26 NOTE — PATIENT INSTRUCTIONS
Outer Hip Stretch: Figure Four (Chair)        Position yourself with your Right ankle on top of your Left knee. Gently push your Right knee down towards the floor to feel a gentle stretch.  Hold for __30 secs. Repeat __2__ times each leg.      Piriformis Stretch - Supine        Pull uninvolved knee across body toward opposite shoulder. Hold slight stretch for _30__ seconds. Repeat _2__ times. Do _2__ times per day.       Stabilization: Transverse Abdominus Contraction - Supine        Lie with knees bent, feet flat. Place fingers on abdominal muscles just inside pelvis. Contract abdominals, pulling naval toward spine. Feel muscle contract, but keep pelvis and back still. Hold for 5 seconds - do not hold your breath!  Repeat __10__ times per set. Do __2__ sets per session throughout the day.     Copyright © [employer]. All rights reserved.

## 2017-12-28 ENCOUNTER — OFFICE VISIT (OUTPATIENT)
Dept: OPHTHALMOLOGY | Facility: CLINIC | Age: 62
End: 2017-12-28
Payer: COMMERCIAL

## 2017-12-28 DIAGNOSIS — H33.022 RETINAL DETACHMENT OF LEFT EYE WITH MULTIPLE BREAKS: ICD-10-CM

## 2017-12-28 DIAGNOSIS — H35.372 LEFT EPIRETINAL MEMBRANE: ICD-10-CM

## 2017-12-28 DIAGNOSIS — H43.811 POSTERIOR VITREOUS DETACHMENT OF RIGHT EYE: ICD-10-CM

## 2017-12-28 PROCEDURE — 92134 CPTRZ OPH DX IMG PST SGM RTA: CPT | Mod: S$GLB,,, | Performed by: OPHTHALMOLOGY

## 2017-12-28 PROCEDURE — 92226 PR SPECIAL EYE EXAM, SUBSEQUENT: CPT | Mod: RT,S$GLB,, | Performed by: OPHTHALMOLOGY

## 2017-12-28 PROCEDURE — 99999 PR PBB SHADOW E&M-EST. PATIENT-LVL III: CPT | Mod: PBBFAC,,, | Performed by: OPHTHALMOLOGY

## 2017-12-28 PROCEDURE — 92014 COMPRE OPH EXAM EST PT 1/>: CPT | Mod: S$GLB,,, | Performed by: OPHTHALMOLOGY

## 2017-12-28 RX ORDER — TETANUS TOXOID, REDUCED DIPHTHERIA TOXOID AND ACELLULAR PERTUSSIS VACCINE, ADSORBED 5; 2.5; 8; 8; 2.5 [IU]/.5ML; [IU]/.5ML; UG/.5ML; UG/.5ML; UG/.5ML
SUSPENSION INTRAMUSCULAR
Refills: 0 | COMMUNITY
Start: 2017-11-26

## 2017-12-28 RX ORDER — INSULIN GLARGINE 100 [IU]/ML
INJECTION, SOLUTION SUBCUTANEOUS
Refills: 0 | COMMUNITY
Start: 2017-12-13 | End: 2020-01-30

## 2017-12-28 RX ORDER — GABAPENTIN 600 MG/1
TABLET ORAL
COMMUNITY
Start: 2017-12-27 | End: 2020-01-30

## 2017-12-28 RX ORDER — PEN NEEDLE, DIABETIC 32 GX 1/4"
NEEDLE, DISPOSABLE MISCELLANEOUS
COMMUNITY
Start: 2017-12-13

## 2017-12-28 NOTE — PROGRESS NOTES
HPI     Concerns About Ocular Health    Additional comments: 1 yr chk           Comments   DLS:  12/22/2016      A/P     1. RD (OS)  -s/p PPV (6/08)    2. Laser (OD)    3. PVD (OD)    4. Trace ERM (OS)    5. DM - Mild NPDR OU - T2 uncontrolled on insulin  No DME    BS/BP/chol control      RD precautions      1 yr. OCT

## 2018-02-26 ENCOUNTER — DOCUMENTATION ONLY (OUTPATIENT)
Dept: REHABILITATION | Facility: HOSPITAL | Age: 63
End: 2018-02-26

## 2018-02-26 PROBLEM — M53.86 DECREASED ROM OF LUMBAR SPINE: Status: RESOLVED | Noted: 2017-12-26 | Resolved: 2018-02-26

## 2018-02-26 PROBLEM — M79.604 PAIN OF RIGHT LOWER EXTREMITY: Status: RESOLVED | Noted: 2017-12-26 | Resolved: 2018-02-26

## 2018-02-26 PROBLEM — R29.898 WEAKNESS OF RIGHT HIP: Status: RESOLVED | Noted: 2017-12-26 | Resolved: 2018-02-26

## 2018-07-10 ENCOUNTER — TELEPHONE (OUTPATIENT)
Dept: GASTROENTEROLOGY | Facility: CLINIC | Age: 63
End: 2018-07-10

## 2019-12-16 ENCOUNTER — TELEPHONE (OUTPATIENT)
Dept: OPHTHALMOLOGY | Facility: CLINIC | Age: 64
End: 2019-12-16

## 2019-12-21 ENCOUNTER — PATIENT MESSAGE (OUTPATIENT)
Dept: OPHTHALMOLOGY | Facility: CLINIC | Age: 64
End: 2019-12-21

## 2020-01-30 ENCOUNTER — OFFICE VISIT (OUTPATIENT)
Dept: OPHTHALMOLOGY | Facility: CLINIC | Age: 65
End: 2020-01-30
Payer: MEDICARE

## 2020-01-30 DIAGNOSIS — H33.022 RETINAL DETACHMENT OF LEFT EYE WITH MULTIPLE BREAKS: ICD-10-CM

## 2020-01-30 DIAGNOSIS — H35.372 LEFT EPIRETINAL MEMBRANE: ICD-10-CM

## 2020-01-30 DIAGNOSIS — H43.811 POSTERIOR VITREOUS DETACHMENT OF RIGHT EYE: ICD-10-CM

## 2020-01-30 PROCEDURE — 92014 PR EYE EXAM, EST PATIENT,COMPREHESV: ICD-10-PCS | Mod: S$PBB,,, | Performed by: OPHTHALMOLOGY

## 2020-01-30 PROCEDURE — 92014 COMPRE OPH EXAM EST PT 1/>: CPT | Mod: S$PBB,,, | Performed by: OPHTHALMOLOGY

## 2020-01-30 PROCEDURE — 99213 OFFICE O/P EST LOW 20 MIN: CPT | Mod: PBBFAC,PO | Performed by: OPHTHALMOLOGY

## 2020-01-30 PROCEDURE — 92202 OPSCPY EXTND ON/MAC DRAW: CPT | Mod: S$PBB,,, | Performed by: OPHTHALMOLOGY

## 2020-01-30 PROCEDURE — 92202 PR OPHTHALMOSCOPY, EXT, W/DRAW OPTIC NERVE/MACULA, I&R, UNI/BI: ICD-10-PCS | Mod: S$PBB,,, | Performed by: OPHTHALMOLOGY

## 2020-01-30 PROCEDURE — 99999 PR PBB SHADOW E&M-EST. PATIENT-LVL III: CPT | Mod: PBBFAC,,, | Performed by: OPHTHALMOLOGY

## 2020-01-30 PROCEDURE — 99999 PR PBB SHADOW E&M-EST. PATIENT-LVL III: ICD-10-PCS | Mod: PBBFAC,,, | Performed by: OPHTHALMOLOGY

## 2020-01-30 RX ORDER — DONEPEZIL HYDROCHLORIDE 10 MG/1
10 TABLET, FILM COATED ORAL
COMMUNITY
Start: 2019-07-25

## 2020-01-30 RX ORDER — IPRATROPIUM BROMIDE 21 UG/1
SPRAY, METERED NASAL
Refills: 6 | COMMUNITY
Start: 2019-12-08

## 2020-01-30 RX ORDER — LEVOTHYROXINE SODIUM 50 UG/1
50 TABLET ORAL
COMMUNITY
Start: 2019-04-18

## 2020-01-30 RX ORDER — MEMANTINE HYDROCHLORIDE 10 MG/1
10 TABLET ORAL
COMMUNITY
Start: 2019-07-25

## 2020-01-30 RX ORDER — GABAPENTIN 600 MG/1
TABLET ORAL
COMMUNITY
Start: 2019-04-18

## 2020-01-30 RX ORDER — IBANDRONATE SODIUM 150 MG/1
TABLET, FILM COATED ORAL
COMMUNITY
Start: 2020-01-06

## 2020-01-30 NOTE — PROGRESS NOTES
HPI     DLS: 12/28/2017 Dr. Preston    Patient here for her yearly follow up exam. Patient           A/P     1. RD (OS)  -s/p PPV (6/08)    2. Laser (OD)    3. PVD (OD)    4. Trace ERM (OS)    5. DM - Mild NPDR OU - T2 uncontrolled on insulin  No DME    BS/BP/chol control      RD precautions      2 yr. OCT

## 2020-12-28 ENCOUNTER — OFFICE VISIT (OUTPATIENT)
Dept: GASTROENTEROLOGY | Facility: CLINIC | Age: 65
End: 2020-12-28
Payer: MEDICARE

## 2020-12-28 DIAGNOSIS — R11.2 NAUSEA AND VOMITING, INTRACTABILITY OF VOMITING NOT SPECIFIED, UNSPECIFIED VOMITING TYPE: Primary | ICD-10-CM

## 2020-12-28 PROCEDURE — 99441 PR PHYSICIAN TELEPHONE EVALUATION 5-10 MIN: ICD-10-PCS | Mod: 95,POP,, | Performed by: INTERNAL MEDICINE

## 2020-12-28 PROCEDURE — 99441 PR PHYSICIAN TELEPHONE EVALUATION 5-10 MIN: CPT | Mod: 95,POP,, | Performed by: INTERNAL MEDICINE

## 2020-12-28 RX ORDER — METOCLOPRAMIDE 5 MG/1
5 TABLET ORAL
Qty: 90 TABLET | Refills: 0 | Status: SHIPPED | OUTPATIENT
Start: 2020-12-28 | End: 2021-02-23 | Stop reason: SDUPTHER

## 2021-02-23 RX ORDER — METOCLOPRAMIDE 5 MG/1
5 TABLET ORAL
Qty: 90 TABLET | Refills: 0 | Status: SHIPPED | OUTPATIENT
Start: 2021-02-23 | End: 2021-05-06 | Stop reason: SDUPTHER

## 2021-05-06 RX ORDER — METOCLOPRAMIDE 5 MG/1
5 TABLET ORAL
Qty: 90 TABLET | Refills: 0 | Status: SHIPPED | OUTPATIENT
Start: 2021-05-06 | End: 2021-07-30

## 2021-09-14 ENCOUNTER — TELEPHONE (OUTPATIENT)
Dept: GASTROENTEROLOGY | Facility: HOSPITAL | Age: 66
End: 2021-09-14

## 2021-09-14 RX ORDER — METOCLOPRAMIDE 5 MG/1
TABLET ORAL
Qty: 90 TABLET | Refills: 0 | Status: SHIPPED | OUTPATIENT
Start: 2021-09-14

## 2021-09-14 RX ORDER — METOCLOPRAMIDE 5 MG/1
TABLET ORAL
Qty: 90 TABLET | Refills: 0 | Status: SHIPPED | OUTPATIENT
Start: 2021-09-14 | End: 2021-09-14 | Stop reason: SDUPTHER

## 2022-02-15 ENCOUNTER — OFFICE VISIT (OUTPATIENT)
Dept: OPHTHALMOLOGY | Facility: CLINIC | Age: 67
End: 2022-02-15
Payer: MEDICARE

## 2022-02-15 DIAGNOSIS — H33.022 RETINAL DETACHMENT OF LEFT EYE WITH MULTIPLE BREAKS: ICD-10-CM

## 2022-02-15 DIAGNOSIS — H43.811 POSTERIOR VITREOUS DETACHMENT OF RIGHT EYE: ICD-10-CM

## 2022-02-15 PROCEDURE — 92134 CPTRZ OPH DX IMG PST SGM RTA: CPT | Mod: PBBFAC | Performed by: OPHTHALMOLOGY

## 2022-02-15 PROCEDURE — 99999 PR PBB SHADOW E&M-EST. PATIENT-LVL III: CPT | Mod: PBBFAC,,, | Performed by: OPHTHALMOLOGY

## 2022-02-15 PROCEDURE — 92014 COMPRE OPH EXAM EST PT 1/>: CPT | Mod: S$PBB,,, | Performed by: OPHTHALMOLOGY

## 2022-02-15 PROCEDURE — 99999 PR PBB SHADOW E&M-EST. PATIENT-LVL III: ICD-10-PCS | Mod: PBBFAC,,, | Performed by: OPHTHALMOLOGY

## 2022-02-15 PROCEDURE — 92201 OPSCPY EXTND RTA DRAW UNI/BI: CPT | Mod: PBBFAC | Performed by: OPHTHALMOLOGY

## 2022-02-15 PROCEDURE — 99213 OFFICE O/P EST LOW 20 MIN: CPT | Mod: PBBFAC | Performed by: OPHTHALMOLOGY

## 2022-02-15 PROCEDURE — 92201 OPSCPY EXTND RTA DRAW UNI/BI: CPT | Mod: S$PBB,,, | Performed by: OPHTHALMOLOGY

## 2022-02-15 PROCEDURE — 92134 POSTERIOR SEGMENT OCT RETINA (OCULAR COHERENCE TOMOGRAPHY)-BOTH EYES: ICD-10-PCS | Mod: 26,S$PBB,, | Performed by: OPHTHALMOLOGY

## 2022-02-15 PROCEDURE — 92014 PR EYE EXAM, EST PATIENT,COMPREHESV: ICD-10-PCS | Mod: S$PBB,,, | Performed by: OPHTHALMOLOGY

## 2022-02-15 PROCEDURE — 92201 PR OPHTHALMOSCOPY, EXT, W/RET DRAW/SCLERAL DEPR, I&R, UNI/BI: ICD-10-PCS | Mod: S$PBB,,, | Performed by: OPHTHALMOLOGY

## 2022-02-15 RX ORDER — BUPROPION HYDROCHLORIDE 150 MG/1
150 TABLET ORAL
COMMUNITY
Start: 2021-11-02

## 2022-02-15 NOTE — PROGRESS NOTES
HPI     DLS 01/30/2020    C/o photophobia OS. No pain. No new f/f.      HPI     DLS: 12/28/2017 Dr. Preston    Patient here for her yearly follow up exam. Patient       OCT - no ME OU      A/P     1. RD (OS)  -s/p PPV (6/08)    2. Laser (OD)    3. PVD (OD)    4. Trace ERM (OS)    5. DM - Mild NPDR OU - T2 uncontrolled on insulin  No DME    BS/BP/chol control      RD precautions      2 yr. OCT

## 2022-02-21 ENCOUNTER — PATIENT MESSAGE (OUTPATIENT)
Dept: OPHTHALMOLOGY | Facility: CLINIC | Age: 67
End: 2022-02-21
Payer: MEDICARE

## 2022-05-06 ENCOUNTER — HOSPITAL ENCOUNTER (OUTPATIENT)
Dept: RADIOLOGY | Facility: HOSPITAL | Age: 67
Discharge: HOME | End: 2022-05-06
Attending: PHYSICIAN ASSISTANT
Payer: MEDICARE

## 2022-05-06 ENCOUNTER — OFFICE VISIT (OUTPATIENT)
Dept: ORTHOPEDICS | Facility: CLINIC | Age: 67
End: 2022-05-06
Payer: MEDICARE

## 2022-05-06 VITALS — HEIGHT: 59 IN | WEIGHT: 115.94 LBS | BODY MASS INDEX: 23.37 KG/M2

## 2022-05-06 DIAGNOSIS — M54.41 CHRONIC RIGHT-SIDED LOW BACK PAIN WITH RIGHT-SIDED SCIATICA: ICD-10-CM

## 2022-05-06 DIAGNOSIS — M16.11 PRIMARY OSTEOARTHRITIS OF RIGHT HIP: ICD-10-CM

## 2022-05-06 DIAGNOSIS — R52 PAIN: ICD-10-CM

## 2022-05-06 DIAGNOSIS — M54.9 DORSALGIA, UNSPECIFIED: ICD-10-CM

## 2022-05-06 DIAGNOSIS — M51.36 DDD (DEGENERATIVE DISC DISEASE), LUMBAR: Primary | ICD-10-CM

## 2022-05-06 DIAGNOSIS — G89.29 CHRONIC RIGHT-SIDED LOW BACK PAIN WITH RIGHT-SIDED SCIATICA: ICD-10-CM

## 2022-05-06 PROCEDURE — 72110 XR LUMBAR SPINE AP AND LAT WITH FLEX/EXT: ICD-10-PCS | Mod: 26,,, | Performed by: RADIOLOGY

## 2022-05-06 PROCEDURE — 73502 X-RAY EXAM HIP UNI 2-3 VIEWS: CPT | Mod: 26,RT,, | Performed by: RADIOLOGY

## 2022-05-06 PROCEDURE — 99203 PR OFFICE/OUTPT VISIT, NEW, LEVL III, 30-44 MIN: ICD-10-PCS | Mod: S$PBB,,, | Performed by: PHYSICIAN ASSISTANT

## 2022-05-06 PROCEDURE — 73502 X-RAY EXAM HIP UNI 2-3 VIEWS: CPT | Mod: TC,RT

## 2022-05-06 PROCEDURE — 99203 OFFICE O/P NEW LOW 30 MIN: CPT | Mod: S$PBB,,, | Performed by: PHYSICIAN ASSISTANT

## 2022-05-06 PROCEDURE — 99999 PR PBB SHADOW E&M-EST. PATIENT-LVL V: CPT | Mod: PBBFAC,,, | Performed by: PHYSICIAN ASSISTANT

## 2022-05-06 PROCEDURE — 72110 X-RAY EXAM L-2 SPINE 4/>VWS: CPT | Mod: TC

## 2022-05-06 PROCEDURE — 99999 PR PBB SHADOW E&M-EST. PATIENT-LVL V: ICD-10-PCS | Mod: PBBFAC,,, | Performed by: PHYSICIAN ASSISTANT

## 2022-05-06 PROCEDURE — 72110 X-RAY EXAM L-2 SPINE 4/>VWS: CPT | Mod: 26,,, | Performed by: RADIOLOGY

## 2022-05-06 PROCEDURE — 73502 XR HIP WITH PELVIS WHEN PERFORMED, 2 OR 3  VIEWS RIGHT: ICD-10-PCS | Mod: 26,RT,, | Performed by: RADIOLOGY

## 2022-05-06 PROCEDURE — 99215 OFFICE O/P EST HI 40 MIN: CPT | Mod: PBBFAC | Performed by: PHYSICIAN ASSISTANT

## 2022-05-06 RX ORDER — MELOXICAM 15 MG/1
15 TABLET ORAL DAILY
Qty: 30 TABLET | Refills: 1 | Status: SHIPPED | OUTPATIENT
Start: 2022-05-06

## 2022-05-06 NOTE — PROGRESS NOTES
SUBJECTIVE:     Chief Complaint   Patient presents with    Right Hip - Pain    Lower Back - Pain    Back Pain     right lower side       History of Present Illness:  Annie Maher is a 67 y.o. year old female presents today right sided low back and hip pain.  She states she has a long history of chronic low back pain, but recently the pain has been severe for about one month.  She has a sharp stabbing pain that radiates down her thigh.  She denies leg weakness, bowel/bladder incontinence. She also has pain that radiates into groin of hip. The pain is worse with ambulation but she also has pain at rest.  She states she had injections in the past however she does not recall the type of injection.  She has been taking OTC ibuprofen.  She also believes she had epidural injections many years ago for her back.  There has been no new injury.  No prior surgery on back or hip.        Past Medical History:   Diagnosis Date    Anxiety     Arthritis     Cataract     Diabetes mellitus     High cholesterol     Osteoporosis, unspecified     Retinal detachment     Spondylosis        Past Surgical History:   Procedure Laterality Date    CATARACT EXTRACTION      RETINAL DETACHMENT SURGERY      SHOULDER SURGERY         Family History   Problem Relation Age of Onset    Cancer Father     Diabetes Brother     Heart failure Mother     Heart attack Mother     Heart disease Mother     Hyperlipidemia Mother     Hypertension Neg Hx     Stroke Neg Hx        Review of patient's allergies indicates:   Allergen Reactions    Pcn [penicillins] Hives and Shortness Of Breath    Stelazine [trifluoperazine] Other (See Comments)     Slurred speech, looked like I had a stroke    Sulfa (sulfonamide antibiotics) Rash         Current Outpatient Medications:     ACCU-CHEK COMPACT TEST Strp, , Disp: , Rfl:     alprazolam (XANAX) 1 MG tablet, Take 1 tablet by mouth Use as needed., Disp: , Rfl:     aspirin 81 MG Chew, Take 81 mg  "by mouth once daily., Disp: , Rfl:     BOOSTRIX TDAP 2.5-8-5 Lf-mcg-Lf/0.5mL Syrg injection, inject 0.5 milliliter intramuscularly, Disp: , Rfl: 0    buPROPion (WELLBUTRIN XL) 150 MG TB24 tablet, Take 150 mg by mouth., Disp: , Rfl:     calcium carbonate 220 mg capsule, Take 220 mg by mouth once daily., Disp: , Rfl:     donepezil (ARICEPT) 10 MG tablet, Take 10 mg by mouth., Disp: , Rfl:     fish oil-omega-3 fatty acids 300-1,000 mg capsule, Take 2 g by mouth once daily., Disp: , Rfl:     gabapentin (NEURONTIN) 600 MG tablet, TAKE 1 TABLET BY MOUTH EVERY DAY WITH DINNER AND 2 TABLETS EVERY NIGHT AT BEDTIME, Disp: , Rfl:     glucosamine-chondroitin 500-400 mg tablet, Take 1 tablet by mouth 3 (three) times daily. Pt taking once a day., Disp: , Rfl:     ibandronate (BONIVA) 150 mg tablet, TAKE 1 TABLET(150 MG) BY MOUTH EVERY 30 DAYS, Disp: , Rfl:     insulin glargine (LANTUS) 100 unit/mL injection, Inject 7 Units into the skin 2 (two) times daily. 15 units once a day., Disp: , Rfl:     ipratropium (ATROVENT) 0.03 % nasal spray, SPR ONCE IEN Q 6 H PRN FOR RUNNY NOSE, Disp: , Rfl: 6    levothyroxine (SYNTHROID) 50 MCG tablet, Take 50 mcg by mouth., Disp: , Rfl:     memantine (NAMENDA) 10 MG Tab, Take 10 mg by mouth., Disp: , Rfl:     metoclopramide HCl (REGLAN) 5 MG tablet, TAKE 1 TABLET(5 MG) BY MOUTH THREE TIMES DAILY BEFORE MEALS, Disp: 90 tablet, Rfl: 0    multivitamin capsule, Take 1 capsule by mouth once daily., Disp: , Rfl:     NOVOFINE 32 32 gauge x 1/4" Ndle, , Disp: , Rfl:     NOVOLOG FLEXPEN 100 unit/mL InPn, Sliding scale, Disp: , Rfl:     omeprazole (PRILOSEC OTC) 20 MG tablet, Take 20 mg by mouth once daily., Disp: , Rfl:     ONETOUCH VERIO Strp, , Disp: , Rfl: 0    rosuvastatin (CRESTOR) 40 MG Tab, Take 40 mg by mouth every evening. Pt to take 1/2 tablet once a day., Disp: , Rfl:     sertraline (ZOLOFT) 100 MG tablet, Take 1 tablet by mouth Twice daily., Disp: , Rfl:     glucagon (human " "recombinant) inj 1mg/mL kit, Inject 1 mL (1 mg total) into the muscle as needed., Disp: 1 kit, Rfl: 6    meloxicam (MOBIC) 15 MG tablet, Take 1 tablet (15 mg total) by mouth once daily., Disp: 30 tablet, Rfl: 1    tramadol (ULTRAM) 50 mg tablet, Take 50 mg by mouth every 8 (eight) hours., Disp: , Rfl: 0    Review of Systems:  ROS:  Constitutional: no fever or chills  Eyes: no visual changes  ENT: no nasal congestion or sore throat  Respiratory: no cough or shortness of breath  Cardiovascular: no chest pain or palpitations  Gastrointestinal: no nausea or vomiting, tolerating diet  Integument/Breast: no rash or pruritis  Musculoskeletal: no arthralgias or myalgias  Neurological: no seizures or tremors  Behavioral/Psych: no auditory or visual hallucinations        PE:  Ht 4' 11" (1.499 m)   Wt 52.6 kg (115 lb 15.4 oz)   BMI 23.42 kg/m²   General: Pleasant, cooperative, NAD   HEENT: NCAT, sclera nonicteric   Lungs: Respirations are equal and unlabored.   Abdomen: Soft and non-tender.  CV: 2+ bilateral upper and lower extremity pulses.   Skin: Intact throughout LE with no rashes, erythema, or lesions  Extremities: No LE edema, NVI lower extremities    Lumbar spine/Right hip exam  Mild right sided lumbar spine tenderness  Mild bilateral GTB tenderness  Pain with passive hip ROM, localizes to groin  5/5 quad, 5/5 hamstring  100 degrees flexion  10 degrees internal rotation  30 degrees external rotation  + stinchfield  5/5 symmetric bilateral LE strength testing    IMAGING:  X-rays of the right hip, personally reviewed by me, demonstrate mild-moderate DJD, joint space narrowing, osteophyte formation.  No fracture or dislocation.    X-rays of the lumbar spine reviewed- dextroscoliosis, lumbar spondylosis    ASSESSMENT/PLAN:   Annie Maher is a 67 y.o. year old female with chronic low back pain and radiculopathy, right hip osteoarthritis     - MRI lumbar spine ordered- she has worsening low back pain with radicular " symptoms  - She also has hip pain consistent with osteoarthritis of the hip.  I will refer her to pain management to discuss interventional procedures of hip and back  - Meloxicam prescription- advised to d/c ibuprofen

## 2022-05-07 ENCOUNTER — PATIENT MESSAGE (OUTPATIENT)
Dept: ORTHOPEDICS | Facility: CLINIC | Age: 67
End: 2022-05-07
Payer: MEDICARE

## 2022-05-09 ENCOUNTER — TELEPHONE (OUTPATIENT)
Dept: ORTHOPEDICS | Facility: CLINIC | Age: 67
End: 2022-05-09
Payer: MEDICARE

## 2022-05-09 ENCOUNTER — PATIENT MESSAGE (OUTPATIENT)
Dept: SPORTS MEDICINE | Facility: CLINIC | Age: 67
End: 2022-05-09
Payer: MEDICARE

## 2022-05-09 NOTE — TELEPHONE ENCOUNTER
Spoke to patient daughter Debi, stated to her that she can call the MRI dept. To get a sooner appointment for her mother. Stated that her mom was in a lot of pain and that she cannot wait until 05/19/2022. Debi  Stated thank you so much. Thanks.

## 2022-05-10 ENCOUNTER — HOSPITAL ENCOUNTER (OUTPATIENT)
Dept: RADIOLOGY | Facility: HOSPITAL | Age: 67
Discharge: HOME | End: 2022-05-10
Attending: PHYSICIAN ASSISTANT
Payer: MEDICARE

## 2022-05-10 DIAGNOSIS — M54.9 DORSALGIA, UNSPECIFIED: ICD-10-CM

## 2022-05-10 PROCEDURE — 72148 MRI LUMBAR SPINE WITHOUT CONTRAST: ICD-10-PCS | Mod: 26,,, | Performed by: INTERNAL MEDICINE

## 2022-05-10 PROCEDURE — 72148 MRI LUMBAR SPINE W/O DYE: CPT | Mod: 26,,, | Performed by: INTERNAL MEDICINE

## 2022-05-10 PROCEDURE — 72148 MRI LUMBAR SPINE W/O DYE: CPT | Mod: TC

## 2022-05-11 ENCOUNTER — OFFICE VISIT (OUTPATIENT)
Dept: SPORTS MEDICINE | Facility: CLINIC | Age: 67
End: 2022-05-11
Payer: MEDICARE

## 2022-05-11 VITALS — TEMPERATURE: 99 F | WEIGHT: 115 LBS | BODY MASS INDEX: 23.18 KG/M2 | HEIGHT: 59 IN

## 2022-05-11 DIAGNOSIS — M16.11 PRIMARY OSTEOARTHRITIS OF RIGHT HIP: ICD-10-CM

## 2022-05-11 DIAGNOSIS — S76.011S TEAR OF RIGHT GLUTEUS MINIMUS TENDON, SEQUELA: ICD-10-CM

## 2022-05-11 DIAGNOSIS — M47.816 LUMBAR SPONDYLOSIS: Primary | ICD-10-CM

## 2022-05-11 DIAGNOSIS — M67.80 TENDINOSIS: ICD-10-CM

## 2022-05-11 DIAGNOSIS — S76.011S TEAR OF RIGHT GLUTEUS MEDIUS TENDON, SEQUELA: ICD-10-CM

## 2022-05-11 DIAGNOSIS — M25.551 PAIN IN JOINT INVOLVING RIGHT PELVIC REGION AND THIGH: ICD-10-CM

## 2022-05-11 DIAGNOSIS — M76.01 GLUTEAL TENDINITIS OF RIGHT BUTTOCK: ICD-10-CM

## 2022-05-11 PROCEDURE — 76942 ECHO GUIDE FOR BIOPSY: CPT | Mod: PBBFAC | Performed by: FAMILY MEDICINE

## 2022-05-11 PROCEDURE — 20551 NJX 1 TENDON ORIGIN/INSJ: CPT | Mod: S$PBB,RT,, | Performed by: FAMILY MEDICINE

## 2022-05-11 PROCEDURE — 99214 OFFICE O/P EST MOD 30 MIN: CPT | Mod: PBBFAC,25 | Performed by: FAMILY MEDICINE

## 2022-05-11 PROCEDURE — 99999 PR PBB SHADOW E&M-EST. PATIENT-LVL IV: ICD-10-PCS | Mod: PBBFAC,,, | Performed by: FAMILY MEDICINE

## 2022-05-11 PROCEDURE — 99204 OFFICE O/P NEW MOD 45 MIN: CPT | Mod: 25,S$PBB,, | Performed by: FAMILY MEDICINE

## 2022-05-11 PROCEDURE — 99204 PR OFFICE/OUTPT VISIT, NEW, LEVL IV, 45-59 MIN: ICD-10-PCS | Mod: 25,S$PBB,, | Performed by: FAMILY MEDICINE

## 2022-05-11 PROCEDURE — 99999 PR PBB SHADOW E&M-EST. PATIENT-LVL IV: CPT | Mod: PBBFAC,,, | Performed by: FAMILY MEDICINE

## 2022-05-11 PROCEDURE — 76942 TENDON ORIGIN: R HIP JOINT: ICD-10-PCS | Mod: 26,S$PBB,, | Performed by: FAMILY MEDICINE

## 2022-05-11 PROCEDURE — 20551 TENDON ORIGIN: R HIP JOINT: ICD-10-PCS | Mod: S$PBB,RT,, | Performed by: FAMILY MEDICINE

## 2022-05-11 RX ORDER — TRIAMCINOLONE ACETONIDE 40 MG/ML
40 INJECTION, SUSPENSION INTRA-ARTICULAR; INTRAMUSCULAR
Status: DISCONTINUED | OUTPATIENT
Start: 2022-05-11 | End: 2022-05-11 | Stop reason: HOSPADM

## 2022-05-11 RX ADMIN — TRIAMCINOLONE ACETONIDE 40 MG: 40 INJECTION, SUSPENSION INTRA-ARTICULAR; INTRAMUSCULAR at 01:05

## 2022-05-11 NOTE — PROCEDURES
"Tendon Origin: R hip joint    Date/Time: 5/11/2022 1:30 PM  Performed by: Kamari Mustafa MD  Authorized by: Kamari Mustafa MD     Consent Done?:  Yes (Verbal)  Timeout: prior to procedure the correct patient, procedure, and site was verified    Indications:  Pain  Site marked: the procedure site was marked    Timeout: prior to procedure the correct patient, procedure, and site was verified    Location:  Hip  Site:  R hip joint  Prep: patient was prepped and draped in usual sterile fashion    Ultrasonic Guidance for Needle Placement?: Yes    Needle size:  20 G  Approach:  Posterolateral  Medications:  40 mg triamcinolone acetonide 40 mg/mL  Patient tolerance:  Patient tolerated the procedure well with no immediate complications   Gluteus medius and gluteus minimus muscles, tendon sheaths, and tendon insertions injection    Description of ultrasound utilization for needle guidance:   Ultrasound guidance used for needle localization. Images saved and stored for documentation. The gluteus medius and minimus muscles and tendons were visualized at their insertions on the greater trochanter. Dynamic visualization of the 20g x 3.5" needle was continuous throughout the procedure.      "

## 2022-05-11 NOTE — PROGRESS NOTES
Annie Maher, a 67 y.o. female, is here for evaluation of right hip/lower back pain.     HISTORY OF PRESENT ILLNESS   Location: proximal thigh   Onset: chronic, insidious  Palliative:    relative rest   oral analgesics, OTC    Provocative: prolonged ambulation  Prior: none  Progression: plateau discomfort   Quality: sharp  Radiation: none  Severity: per nursing documentation  Timing: intermittent with use  Trauma: none    Review of systems (ROS):  A 10+ review of systems was performed with pertinent positives and negatives noted above in the history of present illness. Other systems were negative unless otherwise specified.    PHYSICAL EXAMINATION  General:  The patient is alert and oriented x 3.  Mood is pleasant.  Observation of ears, eyes and nose reveal no gross abnormalities.  HEENT: NCAT, sclera nonicteric  Lungs: Respirations are equal and unlabored.   Gait is coordinated. Patient can toe walk and heel walk without difficulty.    HIP/PELVIS EXAMINATION    Observation/Inspection  Gait:   Nonantalgic   Alignment:  Neutral   Scars:   None   Muscle atrophy: None   Effusion:  None   Warmth:  None   Discoloration:   None   Leg lengths:   Equal   Pelvis:    Level     Tenderness/Crepitus (T/C):      T / C  Trochanteric bursa   - / -  Piriformis    - / -  SI joint    - / -  Psoas tendon   - / -  Rectus insertion  - / -  Adductor insertion  - / -  Pubic symphysis  - / -    ROM: (* = pain)    Flexion:      120 degrees  External rotation:   40 degrees  Internal rotation with axial load:  30 degrees  Internal rotation without axial load:  40 degrees  Abduction:    45 degrees  Adduction:     20 degrees    Special Tests:  Pain w/ forced internal rotation (FADIR):  -   Pain w/ forced external rotation (JUAN R):  -   Circumduction test:     -  Stinchfield test:     -   Log roll:       -   Snapping hip (internal):    -   Sit-up pain:      -   Resisted sit-up pain:     -   Resisted sit-up with adductor contraction pain:  -    Step-down test:     +  Trendelenburg test:     -  Bridge test      +     Extremity Neuro-vascular Examination:   Sensation:  Grossly intact to light touch all dermatomal regions.   Motor Function:  Fully intact motor function at hip, knee, foot and ankle    DTRs;  quadriceps and  achilles 2+.  No clonus and downgoing Babinski.    Vascular status:  DP and PT pulses 2+, brisk capillary refill, symmetric.    Skin:  intact, compartments soft.    Other Findings:    ASSESSMENT & PLAN  Assessment  #1 Multi-level osteoarthritis / spondylosis changes of lumbar spine  #2 Tonnis Grade II osteoarthritis of hip, right   W/ tendinosis of lateral gluteal tendons    No evidence of neurologic pathology  No evidence of vascular pathology    Imaging studies reviewed:  X-ray pelvis and hip, right 22.05  X-ray spine lumbar 22.05  MRI spine lumbar 22.05    Plan  We discussed the importance of appropriate diet, weight, and regular exercise    We discussed options including    Watchful waiting / relative rest    Physical therapy x   Injection therapy CSI lat glut tends r   Consultation    The patient chooses As above   x = prescribed  CSI = corticosteroid injection  VSI = viscosupplement injection  PRPI = platelet rich plasma injection  ia = intra articular  R = right  L = left  B = bilateral   nfSx = surgical consultation was recommended, but patient is not interested in consultation at this time    Physical Therapy        Formal (fPT), @ [facility] facility b   Formal (fPT), @ [facility] facility        Homegoing (hgPT), per concurrent fPT recommendations    Homegoing (hgPT), per prior fPT recommendations    Homegoing (hgPT), handout provided        w/  (atPT)    [blank] = not prescribed  x = prescribed  b = prescribed, and begin as indicated  t = continue as indicated  r = prescribed, and restart as indicated  p = completed prior as indicated  hs = prescribed, and with high school   col = prescribed, and with  college or university   nfPT = physical therapy was recommended, but patient is not interested in PT at this time    Activity (e.g. sports, work) restrictions    [blank] = as tolerated  pt = per physical therapist  at = per   NWB = non weight bearing on affected lower extremity, with crutches assistance for ambulation    Bracing    [blank] = not prescribed  r = recommended, but not fit with at todays visit  f = prescribed and fit with at todays visit  t = continue as indicated  d = d/c  p = as needed  rare = use on rare, as-needed basis; advised against chronic use    Pain management    [blank] = No prescription necessary. A handout detailing dosing of appropriate   over-the-counter musculoskeletal analgesics was made available to the patient.   m = meloxicam x 14 days  mp = 14 day course of meloxicam prescribed prior    Follow up 4   [blank] = as needed  [number] = in [number] weeks  CSI = for corticosteroid injection  VSI = for viscosupplement injection or injection series  PRP = for platelet rich plasma injection or injection series  MRI = after MRI imaging  ns = should surgical options be deferred (no surgery)  o = appointment offered, deferred by patient    Should symptoms worsen or fail to resolve, consider    Revisiting the above options and / or CSI iaHip     Vocation:

## 2022-05-12 RX ORDER — TRIAMCINOLONE ACETONIDE 40 MG/ML
40 INJECTION, SUSPENSION INTRA-ARTICULAR; INTRAMUSCULAR
Status: SHIPPED | OUTPATIENT
Start: 2022-05-11

## 2025-02-10 ENCOUNTER — NURSE TRIAGE (OUTPATIENT)
Dept: ADMINISTRATIVE | Facility: CLINIC | Age: 70
End: 2025-02-10
Payer: MEDICARE

## 2025-02-10 NOTE — TELEPHONE ENCOUNTER
[facility] RN  Patient daughter   Marya,      DM 1   Patient has been c/o pain.  Lumbar spondylosis, rec shots for patient for pain.  Dr. Santos    Pain in joint involving left lower back and a little higher of than lumbar,   99.0   Patient 7/10   Alzheiner.  Patient would like to see if Urine and culture be entered to be done.  Care advise   Reason for Disposition   SEVERE back pain (e.g., excruciating, unable to do any normal activities) and not improved after pain medicine and CARE ADVICE    Additional Information   Negative: Passed out (e.g., fainted, lost consciousness, blacked out and was not responding)   Negative: Shock suspected (e.g., cold/pale/clammy skin, too weak to stand, low BP, rapid pulse)   Negative: Sounds like a life-threatening emergency to the triager   Negative: SEVERE abdominal pain (e.g., excruciating)   Negative: SEVERE back pain of sudden onset and age > 60 years   Negative: Abdominal pain and age > 60 years   Negative: Unable to urinate (or only a few drops) and bladder feels very full   Negative: Loss of bladder or bowel control (urine or bowel incontinence; wetting self, leaking stool) of new-onset   Negative: Numbness (loss of sensation) in groin or rectal area   Negative: Pain radiates into groin, scrotum   Negative: Blood in urine (red, pink, or tea-colored)   Negative: Vomiting and pain over lower ribs of back (i.e., flank - kidney area)   Negative: Weakness of a leg or foot (e.g., unable to bear weight, dragging foot)   Negative: Patient sounds very sick or weak to the triager   Negative: Fever > 100.4 F (38.0 C) and flank pain   Negative: Pain or burning with passing urine (urination)    Protocols used: Back Pain-A-[place]

## 2025-02-13 ENCOUNTER — OFFICE VISIT (OUTPATIENT)
Dept: OPHTHALMOLOGY | Facility: CLINIC | Age: 70
End: 2025-02-13
Payer: MEDICARE

## 2025-02-13 ENCOUNTER — PATIENT MESSAGE (OUTPATIENT)
Dept: OPHTHALMOLOGY | Facility: CLINIC | Age: 70
End: 2025-02-13
Payer: MEDICARE

## 2025-02-13 DIAGNOSIS — H33.022 RETINAL DETACHMENT OF LEFT EYE WITH MULTIPLE BREAKS: ICD-10-CM

## 2025-02-13 DIAGNOSIS — H43.813 POSTERIOR VITREOUS DETACHMENT, BILATERAL: ICD-10-CM

## 2025-02-13 DIAGNOSIS — E11.3293 TYPE 2 DIABETES MELLITUS WITH BOTH EYES AFFECTED BY MILD NONPROLIFERATIVE RETINOPATHY WITHOUT MACULAR EDEMA, WITH LONG-TERM CURRENT USE OF INSULIN: Primary | ICD-10-CM

## 2025-02-13 DIAGNOSIS — Z79.4 TYPE 2 DIABETES MELLITUS WITH BOTH EYES AFFECTED BY MILD NONPROLIFERATIVE RETINOPATHY WITHOUT MACULAR EDEMA, WITH LONG-TERM CURRENT USE OF INSULIN: Primary | ICD-10-CM

## 2025-02-13 PROCEDURE — 92201 OPSCPY EXTND RTA DRAW UNI/BI: CPT | Mod: PBBFAC | Performed by: OPHTHALMOLOGY

## 2025-02-13 PROCEDURE — 92014 COMPRE OPH EXAM EST PT 1/>: CPT | Mod: S$PBB,,, | Performed by: OPHTHALMOLOGY

## 2025-02-13 PROCEDURE — 99213 OFFICE O/P EST LOW 20 MIN: CPT | Mod: PBBFAC | Performed by: OPHTHALMOLOGY

## 2025-02-13 PROCEDURE — 92134 CPTRZ OPH DX IMG PST SGM RTA: CPT | Mod: PBBFAC | Performed by: OPHTHALMOLOGY

## 2025-02-13 PROCEDURE — 99999 PR PBB SHADOW E&M-EST. PATIENT-LVL III: CPT | Mod: PBBFAC,,, | Performed by: OPHTHALMOLOGY

## 2025-02-13 PROCEDURE — 92201 OPSCPY EXTND RTA DRAW UNI/BI: CPT | Mod: S$PBB,,, | Performed by: OPHTHALMOLOGY

## 2025-02-13 NOTE — PROGRESS NOTES
HPI      dme   oct            Additional comments: Dme   Oct     Dfe    Erm   Last bs    135  Last A1c   unsure      Dls 02/15/22             OCT - no ME OU  OS with far temporal MA cluster - stable compared to 2020 OCT        A/P     1. RD (OS)  -s/p PPV (6/08)    2. Laser (OD)    3. PVD OU    4. Trace ERM (OS)    5. DM - Mild NPDR OU - T2 uncontrolled on insulin  No DME  2/25 -  small MA cluster temporally OS - similar to 2020    BS/BP/chol control    6. PCIOL OU      RD precautions      2 yr. OCT